# Patient Record
Sex: FEMALE | Race: WHITE | Employment: FULL TIME | URBAN - METROPOLITAN AREA
[De-identification: names, ages, dates, MRNs, and addresses within clinical notes are randomized per-mention and may not be internally consistent; named-entity substitution may affect disease eponyms.]

---

## 2021-02-05 ENCOUNTER — HOSPITAL ENCOUNTER (EMERGENCY)
Age: 26
Discharge: HOME | End: 2021-02-05
Attending: EMERGENCY MEDICINE
Payer: MEDICAID

## 2021-02-05 ENCOUNTER — APPOINTMENT (OUTPATIENT)
Dept: ULTRASOUND IMAGING | Age: 26
End: 2021-02-05
Payer: MEDICAID

## 2021-02-05 ENCOUNTER — APPOINTMENT (OUTPATIENT)
Dept: CT IMAGING | Age: 26
End: 2021-02-05
Payer: MEDICAID

## 2021-02-05 VITALS
HEIGHT: 66 IN | OXYGEN SATURATION: 98 % | WEIGHT: 125 LBS | DIASTOLIC BLOOD PRESSURE: 68 MMHG | TEMPERATURE: 98.6 F | BODY MASS INDEX: 20.09 KG/M2 | RESPIRATION RATE: 20 BRPM | HEART RATE: 68 BPM | SYSTOLIC BLOOD PRESSURE: 106 MMHG

## 2021-02-05 DIAGNOSIS — D72.819 LEUKOPENIA, UNSPECIFIED TYPE: ICD-10-CM

## 2021-02-05 DIAGNOSIS — K80.20 CALCULUS OF GALLBLADDER WITHOUT CHOLECYSTITIS WITHOUT OBSTRUCTION: Primary | ICD-10-CM

## 2021-02-05 DIAGNOSIS — N30.00 ACUTE CYSTITIS WITHOUT HEMATURIA: ICD-10-CM

## 2021-02-05 DIAGNOSIS — N83.292 COMPLEX CYST OF LEFT OVARY: ICD-10-CM

## 2021-02-05 LAB
ALBUMIN SERPL-MCNC: 4.9 G/DL (ref 3.5–5.2)
ALP BLD-CCNC: 65 U/L (ref 35–104)
ALT SERPL-CCNC: 7 U/L (ref 0–32)
ANION GAP SERPL CALCULATED.3IONS-SCNC: 9 MMOL/L (ref 7–16)
AST SERPL-CCNC: 14 U/L (ref 0–31)
BACTERIA: ABNORMAL /HPF
BASOPHILS ABSOLUTE: 0.02 E9/L (ref 0–0.2)
BASOPHILS RELATIVE PERCENT: 0.5 % (ref 0–2)
BILIRUB SERPL-MCNC: 0.5 MG/DL (ref 0–1.2)
BILIRUBIN URINE: NEGATIVE
BLOOD, URINE: NEGATIVE
BUN BLDV-MCNC: 13 MG/DL (ref 6–20)
CALCIUM SERPL-MCNC: 9.3 MG/DL (ref 8.6–10.2)
CHLORIDE BLD-SCNC: 102 MMOL/L (ref 98–107)
CLARITY: ABNORMAL
CO2: 24 MMOL/L (ref 22–29)
COLOR: YELLOW
CREAT SERPL-MCNC: 0.7 MG/DL (ref 0.5–1)
EOSINOPHILS ABSOLUTE: 0.02 E9/L (ref 0.05–0.5)
EOSINOPHILS RELATIVE PERCENT: 0.5 % (ref 0–6)
EPITHELIAL CELLS, UA: ABNORMAL /HPF
GFR AFRICAN AMERICAN: >60
GFR NON-AFRICAN AMERICAN: >60 ML/MIN/1.73
GLUCOSE BLD-MCNC: 105 MG/DL (ref 74–99)
GLUCOSE URINE: NEGATIVE MG/DL
HCG, URINE, POC: NEGATIVE
HCT VFR BLD CALC: 39.6 % (ref 34–48)
HEMOGLOBIN: 13.2 G/DL (ref 11.5–15.5)
IMMATURE GRANULOCYTES #: 0.02 E9/L
IMMATURE GRANULOCYTES %: 0.5 % (ref 0–5)
KETONES, URINE: ABNORMAL MG/DL
LACTIC ACID: 0.8 MMOL/L (ref 0.5–2.2)
LEUKOCYTE ESTERASE, URINE: ABNORMAL
LIPASE: 23 U/L (ref 13–60)
LYMPHOCYTES ABSOLUTE: 0.73 E9/L (ref 1.5–4)
LYMPHOCYTES RELATIVE PERCENT: 16.8 % (ref 20–42)
Lab: NORMAL
MCH RBC QN AUTO: 30.3 PG (ref 26–35)
MCHC RBC AUTO-ENTMCNC: 33.3 % (ref 32–34.5)
MCV RBC AUTO: 90.8 FL (ref 80–99.9)
MONOCYTES ABSOLUTE: 0.31 E9/L (ref 0.1–0.95)
MONOCYTES RELATIVE PERCENT: 7.1 % (ref 2–12)
NEGATIVE QC PASS/FAIL: NORMAL
NEUTROPHILS ABSOLUTE: 3.24 E9/L (ref 1.8–7.3)
NEUTROPHILS RELATIVE PERCENT: 74.6 % (ref 43–80)
NITRITE, URINE: NEGATIVE
PDW BLD-RTO: 13.2 FL (ref 11.5–15)
PH UA: 5.5 (ref 5–9)
PLATELET # BLD: 169 E9/L (ref 130–450)
PMV BLD AUTO: 10.1 FL (ref 7–12)
POSITIVE QC PASS/FAIL: NORMAL
POTASSIUM SERPL-SCNC: 4 MMOL/L (ref 3.5–5)
PROTEIN UA: NEGATIVE MG/DL
RBC # BLD: 4.36 E12/L (ref 3.5–5.5)
RBC UA: ABNORMAL /HPF (ref 0–2)
SODIUM BLD-SCNC: 135 MMOL/L (ref 132–146)
SPECIFIC GRAVITY UA: >=1.03 (ref 1–1.03)
TOTAL PROTEIN: 8.2 G/DL (ref 6.4–8.3)
UROBILINOGEN, URINE: 0.2 E.U./DL
WBC # BLD: 4.3 E9/L (ref 4.5–11.5)
WBC UA: ABNORMAL /HPF (ref 0–5)

## 2021-02-05 PROCEDURE — 85025 COMPLETE CBC W/AUTO DIFF WBC: CPT

## 2021-02-05 PROCEDURE — 80053 COMPREHEN METABOLIC PANEL: CPT

## 2021-02-05 PROCEDURE — 96374 THER/PROPH/DIAG INJ IV PUSH: CPT

## 2021-02-05 PROCEDURE — 99285 EMERGENCY DEPT VISIT HI MDM: CPT

## 2021-02-05 PROCEDURE — 83690 ASSAY OF LIPASE: CPT

## 2021-02-05 PROCEDURE — 76705 ECHO EXAM OF ABDOMEN: CPT

## 2021-02-05 PROCEDURE — 74177 CT ABD & PELVIS W/CONTRAST: CPT

## 2021-02-05 PROCEDURE — 2580000003 HC RX 258: Performed by: PHYSICIAN ASSISTANT

## 2021-02-05 PROCEDURE — 83605 ASSAY OF LACTIC ACID: CPT

## 2021-02-05 PROCEDURE — 81001 URINALYSIS AUTO W/SCOPE: CPT

## 2021-02-05 PROCEDURE — 6360000002 HC RX W HCPCS: Performed by: PHYSICIAN ASSISTANT

## 2021-02-05 PROCEDURE — 2580000003 HC RX 258: Performed by: RADIOLOGY

## 2021-02-05 PROCEDURE — 87088 URINE BACTERIA CULTURE: CPT

## 2021-02-05 PROCEDURE — 6360000004 HC RX CONTRAST MEDICATION: Performed by: RADIOLOGY

## 2021-02-05 PROCEDURE — 76856 US EXAM PELVIC COMPLETE: CPT

## 2021-02-05 PROCEDURE — 96375 TX/PRO/DX INJ NEW DRUG ADDON: CPT

## 2021-02-05 RX ORDER — SODIUM CHLORIDE 0.9 % (FLUSH) 0.9 %
10 SYRINGE (ML) INJECTION PRN
Status: COMPLETED | OUTPATIENT
Start: 2021-02-05 | End: 2021-02-05

## 2021-02-05 RX ORDER — KETOROLAC TROMETHAMINE 30 MG/ML
15 INJECTION, SOLUTION INTRAMUSCULAR; INTRAVENOUS ONCE
Status: COMPLETED | OUTPATIENT
Start: 2021-02-05 | End: 2021-02-05

## 2021-02-05 RX ORDER — SULFAMETHOXAZOLE AND TRIMETHOPRIM 800; 160 MG/1; MG/1
1 TABLET ORAL 2 TIMES DAILY
Qty: 10 TABLET | Refills: 0 | Status: SHIPPED | OUTPATIENT
Start: 2021-02-05 | End: 2021-02-11 | Stop reason: ALTCHOICE

## 2021-02-05 RX ORDER — ONDANSETRON 2 MG/ML
4 INJECTION INTRAMUSCULAR; INTRAVENOUS ONCE
Status: COMPLETED | OUTPATIENT
Start: 2021-02-05 | End: 2021-02-05

## 2021-02-05 RX ORDER — ONDANSETRON 4 MG/1
4 TABLET, ORALLY DISINTEGRATING ORAL EVERY 8 HOURS PRN
Qty: 10 TABLET | Refills: 0 | Status: SHIPPED | OUTPATIENT
Start: 2021-02-05 | End: 2022-03-07

## 2021-02-05 RX ORDER — 0.9 % SODIUM CHLORIDE 0.9 %
1000 INTRAVENOUS SOLUTION INTRAVENOUS ONCE
Status: COMPLETED | OUTPATIENT
Start: 2021-02-05 | End: 2021-02-05

## 2021-02-05 RX ORDER — KETOROLAC TROMETHAMINE 10 MG/1
10 TABLET, FILM COATED ORAL EVERY 8 HOURS PRN
Qty: 20 TABLET | Refills: 0 | Status: SHIPPED | OUTPATIENT
Start: 2021-02-05 | End: 2021-02-11 | Stop reason: ALTCHOICE

## 2021-02-05 RX ADMIN — IOPAMIDOL 75 ML: 755 INJECTION, SOLUTION INTRAVENOUS at 11:01

## 2021-02-05 RX ADMIN — KETOROLAC TROMETHAMINE 15 MG: 30 INJECTION, SOLUTION INTRAMUSCULAR at 09:56

## 2021-02-05 RX ADMIN — Medication 10 ML: at 10:59

## 2021-02-05 RX ADMIN — ONDANSETRON 4 MG: 2 INJECTION INTRAMUSCULAR; INTRAVENOUS at 09:57

## 2021-02-05 RX ADMIN — SODIUM CHLORIDE 1000 ML: 9 INJECTION, SOLUTION INTRAVENOUS at 09:56

## 2021-02-05 NOTE — ED PROVIDER NOTES
Lymphocytes % 16.8 (L) 20.0 - 42.0 %    Monocytes % 7.1 2.0 - 12.0 %    Eosinophils % 0.5 0.0 - 6.0 %    Basophils % 0.5 0.0 - 2.0 %    Neutrophils Absolute 3.24 1.80 - 7.30 E9/L    Immature Granulocytes # 0.02 E9/L    Lymphocytes Absolute 0.73 (L) 1.50 - 4.00 E9/L    Monocytes Absolute 0.31 0.10 - 0.95 E9/L    Eosinophils Absolute 0.02 (L) 0.05 - 0.50 E9/L    Basophils Absolute 0.02 0.00 - 0.20 E9/L   Comprehensive Metabolic Panel   Result Value Ref Range    Sodium 135 132 - 146 mmol/L    Potassium 4.0 3.5 - 5.0 mmol/L    Chloride 102 98 - 107 mmol/L    CO2 24 22 - 29 mmol/L    Anion Gap 9 7 - 16 mmol/L    Glucose 105 (H) 74 - 99 mg/dL    BUN 13 6 - 20 mg/dL    CREATININE 0.7 0.5 - 1.0 mg/dL    GFR Non-African American >60 >=60 mL/min/1.73    GFR African American >60     Calcium 9.3 8.6 - 10.2 mg/dL    Total Protein 8.2 6.4 - 8.3 g/dL    Albumin 4.9 3.5 - 5.2 g/dL    Total Bilirubin 0.5 0.0 - 1.2 mg/dL    Alkaline Phosphatase 65 35 - 104 U/L    ALT 7 0 - 32 U/L    AST 14 0 - 31 U/L   Lipase   Result Value Ref Range    Lipase 23 13 - 60 U/L   Lactic Acid, Plasma   Result Value Ref Range    Lactic Acid 0.8 0.5 - 2.2 mmol/L   POC Pregnancy Urine Qual   Result Value Ref Range    HCG, Urine, POC Negative Negative    Lot Number [id number]     Positive QC Pass/Fail Pass     Negative QC Pass/Fail Pass      Imaging: All Radiology results interpreted by Radiologist unless otherwise noted. US PELVIS COMPLETE   Final Result   1. 4.2 cm complex left ovarian cyst.  Please see below for recommendations. 2. Otherwise, unremarkable transabdominal pelvic ultrasound. RECOMMENDATIONS:   4.2 cm worrisome for malignancy ovarian cyst. Recommend surgical evaluation. Reference: Radiology 2010 Sep;256(3):943-54      US GALLBLADDER RUQ   Final Result   1. Cholelithiasis. There is no evidence of acute cholecystitis.       CT ABDOMEN PELVIS W IV CONTRAST   Final Result   Distended gallbladder with questionable mild circumferential wall thickening. Cholelithiasis is noted as well. Recommend correlation with right upper   quadrant ultrasound if the patient has right upper quadrant pain, as acute   cholecystitis cannot be excluded. 4.6 cm cyst in the midline of the pelvis, felt to likely be arising from the   left ovary. Given the adjacent free fluid and size of the cyst, recommend   correlation with pelvic ultrasound. Suspect small area of focal fat deposition in the right hepatic lobe. Recommend correlation with right upper quadrant ultrasound on a nonemergent   basis. ED Course / Medical Decision Making     Medications   ondansetron [facility]) injection 4 mg (4 mg Intravenous Given 2/5/21 0957)   0.9 % sodium chloride bolus (0 mLs Intravenous Stopped 2/5/21 1152)   ketorolac (TORADOL) injection 15 mg (15 mg Intravenous Given 2/5/21 0956)   iopamidol (ISOVUE-370) 76 % injection 75 mL (75 mLs Intravenous Given 2/5/21 1101)   sodium chloride flush 0.9 % injection 10 mL (10 mLs Intravenous Given 2/5/21 1059)          Consults:   None    Procedures:   none    MDM:   Patient presenting with right-sided abdominal pain. Patient is in no acute distress, afebrile, nontoxic appearance. Patient's labs are stable except for leukopenia. Patient CT concerning for distended gallbladder with recommendation for ultrasound. Patient CT also showed a left ovarian cyst and recommended ultrasound. Patient's gallbladder ultrasound showed a cholelithiasis but no cholecystitis. Patient's ultrasound of her ovaries showed a 4.2 cm complex left ovarian cyst with recommendation for surgical evaluation. Patient's urine showed possible UTI-patient will be treated with Bactrim due to her penicillin allergy. Patient will be sent with Toradol and Zofran as well since it controlled her pain in the ED. Patient to follow-up with gynecology and general surgery. Recommended patient return to the ED with new or worsening of symptoms.     Plan of Care/Counseling: I reviewed today's visit with the patient in addition to providing specific details for the plan of care and counseling regarding the diagnosis and prognosis. Questions are answered at this time and are agreeable with the plan. Assessment      1. Calculus of gallbladder without cholecystitis without obstruction    2. Complex cyst of left ovary    3. Leukopenia, unspecified type    4. Acute cystitis without hematuria      Plan   Discharge home  Patient condition is stable    New Medications     Discharge Medication List as of 2/5/2021  1:39 PM      START taking these medications    Details   ketorolac (TORADOL) 10 MG tablet Take 1 tablet by mouth every 8 hours as needed for Pain, Disp-20 tablet, R-0Print      ondansetron (ZOFRAN ODT) 4 MG disintegrating tablet Take 1 tablet by mouth every 8 hours as needed for Nausea or Vomiting, Disp-10 tablet, R-0Print      sulfamethoxazole-trimethoprim (BACTRIM DS) 800-160 MG per tablet Take 1 tablet by mouth 2 times daily for 5 days, Disp-10 tablet, R-0Print           Electronically signed by Wilson Bhatt PA-C   DD: 2/5/21  **This report was transcribed using voice recognition software. Every effort was made to ensure accuracy; however, inadvertent computerized transcription errors may be present.   END OF ED PROVIDER NOTE     Wilson Bhatt PA-C  02/06/21 9949

## 2021-02-07 LAB — URINE CULTURE, ROUTINE: NORMAL

## 2021-02-11 ENCOUNTER — HOSPITAL ENCOUNTER (EMERGENCY)
Age: 26
Discharge: HOME | DRG: 282 | End: 2021-02-11
Attending: EMERGENCY MEDICINE
Payer: MEDICAID

## 2021-02-11 ENCOUNTER — APPOINTMENT (OUTPATIENT)
Dept: GENERAL RADIOLOGY | Age: 26
DRG: 282 | End: 2021-02-11
Payer: MEDICAID

## 2021-02-11 VITALS
WEIGHT: 125 LBS | DIASTOLIC BLOOD PRESSURE: 74 MMHG | HEART RATE: 82 BPM | OXYGEN SATURATION: 99 % | HEIGHT: 66 IN | BODY MASS INDEX: 20.09 KG/M2 | TEMPERATURE: 98.5 F | SYSTOLIC BLOOD PRESSURE: 110 MMHG | RESPIRATION RATE: 16 BRPM

## 2021-02-11 DIAGNOSIS — K80.50 BILIARY COLIC: Primary | ICD-10-CM

## 2021-02-11 LAB
ALBUMIN SERPL-MCNC: 4.5 G/DL (ref 3.5–5.2)
ALP BLD-CCNC: 75 U/L (ref 35–104)
ALT SERPL-CCNC: 44 U/L (ref 0–32)
ANION GAP SERPL CALCULATED.3IONS-SCNC: 9 MMOL/L (ref 7–16)
AST SERPL-CCNC: 110 U/L (ref 0–31)
BACTERIA: ABNORMAL /HPF
BASOPHILS ABSOLUTE: 0.02 E9/L (ref 0–0.2)
BASOPHILS RELATIVE PERCENT: 0.2 % (ref 0–2)
BILIRUB SERPL-MCNC: 0.9 MG/DL (ref 0–1.2)
BILIRUBIN URINE: NEGATIVE
BLOOD, URINE: NEGATIVE
BUN BLDV-MCNC: 11 MG/DL (ref 6–20)
CALCIUM SERPL-MCNC: 9 MG/DL (ref 8.6–10.2)
CHLORIDE BLD-SCNC: 100 MMOL/L (ref 98–107)
CLARITY: CLEAR
CO2: 24 MMOL/L (ref 22–29)
COLOR: YELLOW
CREAT SERPL-MCNC: 0.9 MG/DL (ref 0.5–1)
EOSINOPHILS ABSOLUTE: 0.01 E9/L (ref 0.05–0.5)
EOSINOPHILS RELATIVE PERCENT: 0.1 % (ref 0–6)
GFR AFRICAN AMERICAN: >60
GFR NON-AFRICAN AMERICAN: >60 ML/MIN/1.73
GLUCOSE BLD-MCNC: 125 MG/DL (ref 74–99)
GLUCOSE URINE: NEGATIVE MG/DL
HCG, URINE, POC: NEGATIVE
HCT VFR BLD CALC: 36.2 % (ref 34–48)
HEMOGLOBIN: 12.3 G/DL (ref 11.5–15.5)
IMMATURE GRANULOCYTES #: 0.02 E9/L
IMMATURE GRANULOCYTES %: 0.2 % (ref 0–5)
KETONES, URINE: 15 MG/DL
LACTIC ACID: 0.8 MMOL/L (ref 0.5–2.2)
LEUKOCYTE ESTERASE, URINE: ABNORMAL
LIPASE: 29 U/L (ref 13–60)
LYMPHOCYTES ABSOLUTE: 0.65 E9/L (ref 1.5–4)
LYMPHOCYTES RELATIVE PERCENT: 7.8 % (ref 20–42)
Lab: NORMAL
MCH RBC QN AUTO: 30.4 PG (ref 26–35)
MCHC RBC AUTO-ENTMCNC: 34 % (ref 32–34.5)
MCV RBC AUTO: 89.4 FL (ref 80–99.9)
MONOCYTES ABSOLUTE: 0.59 E9/L (ref 0.1–0.95)
MONOCYTES RELATIVE PERCENT: 7.1 % (ref 2–12)
NEGATIVE QC PASS/FAIL: NORMAL
NEUTROPHILS ABSOLUTE: 7.05 E9/L (ref 1.8–7.3)
NEUTROPHILS RELATIVE PERCENT: 84.6 % (ref 43–80)
NITRITE, URINE: NEGATIVE
PDW BLD-RTO: 13 FL (ref 11.5–15)
PH UA: 6.5 (ref 5–9)
PLATELET # BLD: 176 E9/L (ref 130–450)
PMV BLD AUTO: 10 FL (ref 7–12)
POSITIVE QC PASS/FAIL: NORMAL
POTASSIUM SERPL-SCNC: 3.9 MMOL/L (ref 3.5–5)
PROTEIN UA: NEGATIVE MG/DL
RBC # BLD: 4.05 E12/L (ref 3.5–5.5)
RBC UA: ABNORMAL /HPF (ref 0–2)
RENAL EPITHELIAL, UA: ABNORMAL /HPF
SODIUM BLD-SCNC: 133 MMOL/L (ref 132–146)
SPECIFIC GRAVITY UA: 1.01 (ref 1–1.03)
TOTAL PROTEIN: 7.4 G/DL (ref 6.4–8.3)
UROBILINOGEN, URINE: 1 E.U./DL
WBC # BLD: 8.3 E9/L (ref 4.5–11.5)
WBC UA: ABNORMAL /HPF (ref 0–5)

## 2021-02-11 PROCEDURE — 83605 ASSAY OF LACTIC ACID: CPT

## 2021-02-11 PROCEDURE — 83690 ASSAY OF LIPASE: CPT

## 2021-02-11 PROCEDURE — 74022 RADEX COMPL AQT ABD SERIES: CPT

## 2021-02-11 PROCEDURE — 85025 COMPLETE CBC W/AUTO DIFF WBC: CPT

## 2021-02-11 PROCEDURE — 2580000003 HC RX 258: Performed by: EMERGENCY MEDICINE

## 2021-02-11 PROCEDURE — 6360000002 HC RX W HCPCS: Performed by: EMERGENCY MEDICINE

## 2021-02-11 PROCEDURE — 81001 URINALYSIS AUTO W/SCOPE: CPT

## 2021-02-11 PROCEDURE — 80053 COMPREHEN METABOLIC PANEL: CPT

## 2021-02-11 RX ORDER — 0.9 % SODIUM CHLORIDE 0.9 %
500 INTRAVENOUS SOLUTION INTRAVENOUS ONCE
Status: COMPLETED | OUTPATIENT
Start: 2021-02-11 | End: 2021-02-11

## 2021-02-11 RX ORDER — FAMOTIDINE 20 MG/1
20 TABLET, FILM COATED ORAL 2 TIMES DAILY PRN
Qty: 60 TABLET | Refills: 0 | Status: SHIPPED | OUTPATIENT
Start: 2021-02-11 | End: 2022-03-07

## 2021-02-11 RX ORDER — MORPHINE SULFATE 4 MG/ML
4 INJECTION, SOLUTION INTRAMUSCULAR; INTRAVENOUS ONCE
Status: COMPLETED | OUTPATIENT
Start: 2021-02-11 | End: 2021-02-11

## 2021-02-11 RX ORDER — KETOROLAC TROMETHAMINE 30 MG/ML
15 INJECTION, SOLUTION INTRAMUSCULAR; INTRAVENOUS ONCE
Status: COMPLETED | OUTPATIENT
Start: 2021-02-11 | End: 2021-02-11

## 2021-02-11 RX ORDER — ONDANSETRON 4 MG/1
4 TABLET, ORALLY DISINTEGRATING ORAL EVERY 8 HOURS PRN
Qty: 10 TABLET | Refills: 0 | Status: ON HOLD | OUTPATIENT
Start: 2021-02-11 | End: 2021-02-18 | Stop reason: HOSPADM

## 2021-02-11 RX ORDER — HYDROCODONE BITARTRATE AND ACETAMINOPHEN 5; 325 MG/1; MG/1
1 TABLET ORAL EVERY 6 HOURS PRN
Qty: 10 TABLET | Refills: 0 | Status: ON HOLD | OUTPATIENT
Start: 2021-02-11 | End: 2021-02-18

## 2021-02-11 RX ADMIN — SODIUM CHLORIDE 500 ML: 9 INJECTION, SOLUTION INTRAVENOUS at 02:41

## 2021-02-11 RX ADMIN — MORPHINE SULFATE 4 MG: 4 INJECTION, SOLUTION INTRAMUSCULAR; INTRAVENOUS at 03:55

## 2021-02-11 RX ADMIN — SODIUM CHLORIDE 500 ML: 9 INJECTION, SOLUTION INTRAVENOUS at 03:55

## 2021-02-11 RX ADMIN — KETOROLAC TROMETHAMINE 15 MG: 30 INJECTION, SOLUTION INTRAMUSCULAR; INTRAVENOUS at 03:09

## 2021-02-11 ASSESSMENT — PAIN DESCRIPTION - DESCRIPTORS: DESCRIPTORS: ACHING

## 2021-02-11 ASSESSMENT — PAIN DESCRIPTION - FREQUENCY: FREQUENCY: CONTINUOUS

## 2021-02-11 ASSESSMENT — PAIN SCALES - GENERAL
PAINLEVEL_OUTOF10: 5
PAINLEVEL_OUTOF10: 3

## 2021-02-11 ASSESSMENT — PAIN DESCRIPTION - LOCATION: LOCATION: ABDOMEN;FLANK

## 2021-02-11 NOTE — ED PROVIDER NOTES
Clear to auscultation and breath sounds equal.  CV:  Regular rate and rhythm. GI:  normal appearing, non-distended with no visible hernias. Bowel sounds: normal bowel sounds. Tenderness: There is mild tenderness present - located in the RUQ., There is no rebound tenderness. , There is no guarding. , There is no distension. , There is no pulsatile mass. .           Liver: non-tender. Spleen:  non-tender. Back: CVA Tenderness: No.  : deferred  Integument:  Normal turgor. Warm, dry, without visible rash, unless noted elsewhere. Lymphatics: No edema, cap.refill <3sec. Neurological:  Orientation age-appropriate. Motor functions intact.     Lab / Imaging Results   (All laboratory and radiology results have been personally reviewed by myself)  Labs:  Results for orders placed or performed during the hospital encounter of 02/11/21   Comprehensive Metabolic Panel   Result Value Ref Range    Sodium 133 132 - 146 mmol/L    Potassium 3.9 3.5 - 5.0 mmol/L    Chloride 100 98 - 107 mmol/L    CO2 24 22 - 29 mmol/L    Anion Gap 9 7 - 16 mmol/L    Glucose 125 (H) 74 - 99 mg/dL    BUN 11 6 - 20 mg/dL    CREATININE 0.9 0.5 - 1.0 mg/dL    GFR Non-African American >60 >=60 mL/min/1.73    GFR African American >60     Calcium 9.0 8.6 - 10.2 mg/dL    Total Protein 7.4 6.4 - 8.3 g/dL    Albumin 4.5 3.5 - 5.2 g/dL    Total Bilirubin 0.9 0.0 - 1.2 mg/dL    Alkaline Phosphatase 75 35 - 104 U/L    ALT 44 (H) 0 - 32 U/L     (H) 0 - 31 U/L   CBC Auto Differential   Result Value Ref Range    WBC 8.3 4.5 - 11.5 E9/L    RBC 4.05 3.50 - 5.50 E12/L    Hemoglobin 12.3 11.5 - 15.5 g/dL    Hematocrit 36.2 34.0 - 48.0 %    MCV 89.4 80.0 - 99.9 fL    MCH 30.4 26.0 - 35.0 pg    MCHC 34.0 32.0 - 34.5 %    RDW 13.0 11.5 - 15.0 fL    Platelets 357 119 - 987 E9/L    MPV 10.0 7.0 - 12.0 fL    Neutrophils % 84.6 (H) 43.0 - 80.0 %    Immature Granulocytes % 0.2 0.0 - 5.0 % Lymphocytes % 7.8 (L) 20.0 - 42.0 %    Monocytes % 7.1 2.0 - 12.0 %    Eosinophils % 0.1 0.0 - 6.0 %    Basophils % 0.2 0.0 - 2.0 %    Neutrophils Absolute 7.05 1.80 - 7.30 E9/L    Immature Granulocytes # 0.02 E9/L    Lymphocytes Absolute 0.65 (L) 1.50 - 4.00 E9/L    Monocytes Absolute 0.59 0.10 - 0.95 E9/L    Eosinophils Absolute 0.01 (L) 0.05 - 0.50 E9/L    Basophils Absolute 0.02 0.00 - 0.20 E9/L   Lipase   Result Value Ref Range    Lipase 29 13 - 60 U/L   Urinalysis   Result Value Ref Range    Color, UA Yellow Straw/Yellow    Clarity, UA Clear Clear    Glucose, Ur Negative Negative mg/dL    Bilirubin Urine Negative Negative    Ketones, Urine 15 (A) Negative mg/dL    Specific Gravity, UA 1.015 1.005 - 1.030    Blood, Urine Negative Negative    pH, UA 6.5 5.0 - 9.0    Protein, UA Negative Negative mg/dL    Urobilinogen, Urine 1.0 <2.0 E.U./dL    Nitrite, Urine Negative Negative    Leukocyte Esterase, Urine SMALL (A) Negative   Lactic Acid, Plasma   Result Value Ref Range    Lactic Acid 0.8 0.5 - 2.2 mmol/L   Microscopic Urinalysis   Result Value Ref Range    WBC, UA 2-5 0 - 5 /HPF    RBC, UA 0-1 0 - 2 /HPF    Renal Epithelial, UA FEW /HPF    Bacteria, UA RARE (A) None Seen /HPF   POC Pregnancy Urine Qual   Result Value Ref Range    HCG, Urine, POC Negative Negative    Lot Number [id number]     Positive QC Pass/Fail Pass     Negative QC Pass/Fail Pass      Imaging: All Radiology results interpreted by Radiologist unless otherwise noted. XR ACUTE ABD SERIES CHEST 1 VW   Final Result   Cholelithiasis. Moderate stool burden.         ED Course / Medical Decision Making     Medications   0.9 % sodium chloride bolus (0 mLs Intravenous Stopped 2/11/21 5608)   ketorolac (TORADOL) injection 15 mg (15 mg Intravenous Given 2/11/21 7396)   morphine sulfate (PF) injection 4 mg (4 mg Intravenous Given 2/11/21 7537)   0.9 % sodium chloride bolus (0 mLs Intravenous Stopped 2/11/21 0517)        Re-examination:  2/11/21 inadvertent computerized transcription errors may be present.   END OF ED PROVIDER NOTE        Xenia Banegas, DO  02/11/21 5837

## 2021-02-11 NOTE — ED TRIAGE NOTES
Pt states seen previously for same complaint.  Was diagnosed with gallstones and ovarian cysts and has not followed up with provider for issue

## 2021-02-13 ENCOUNTER — HOSPITAL ENCOUNTER (INPATIENT)
Age: 26
LOS: 5 days | Discharge: HOME | DRG: 282 | End: 2021-02-18
Attending: EMERGENCY MEDICINE | Admitting: INTERNAL MEDICINE
Payer: MEDICAID

## 2021-02-13 DIAGNOSIS — K85.10 ACUTE GALLSTONE PANCREATITIS: Primary | ICD-10-CM

## 2021-02-13 DIAGNOSIS — K80.50 BILIARY COLIC: ICD-10-CM

## 2021-02-13 PROBLEM — K85.90 PANCREATITIS, UNSPECIFIED PANCREATITIS TYPE: Status: ACTIVE | Noted: 2021-02-13

## 2021-02-13 LAB
ALBUMIN SERPL-MCNC: 4.3 G/DL (ref 3.5–5.2)
ALP BLD-CCNC: 113 U/L (ref 35–104)
ALT SERPL-CCNC: 496 U/L (ref 0–32)
ANION GAP SERPL CALCULATED.3IONS-SCNC: 10 MMOL/L (ref 7–16)
AST SERPL-CCNC: 410 U/L (ref 0–31)
BACTERIA: NORMAL /HPF
BASOPHILS ABSOLUTE: 0.02 E9/L (ref 0–0.2)
BASOPHILS RELATIVE PERCENT: 0.4 % (ref 0–2)
BILIRUB SERPL-MCNC: 3.5 MG/DL (ref 0–1.2)
BILIRUBIN URINE: ABNORMAL
BLOOD, URINE: NEGATIVE
BUN BLDV-MCNC: 8 MG/DL (ref 6–20)
CALCIUM SERPL-MCNC: 8.9 MG/DL (ref 8.6–10.2)
CHLORIDE BLD-SCNC: 101 MMOL/L (ref 98–107)
CLARITY: CLEAR
CO2: 25 MMOL/L (ref 22–29)
COLOR: YELLOW
CREAT SERPL-MCNC: 1 MG/DL (ref 0.5–1)
EOSINOPHILS ABSOLUTE: 0.09 E9/L (ref 0.05–0.5)
EOSINOPHILS RELATIVE PERCENT: 1.9 % (ref 0–6)
EPITHELIAL CELLS, UA: NORMAL /HPF
GFR AFRICAN AMERICAN: >60
GFR NON-AFRICAN AMERICAN: >60 ML/MIN/1.73
GLUCOSE BLD-MCNC: 134 MG/DL (ref 74–99)
GLUCOSE URINE: NEGATIVE MG/DL
HCG(URINE) PREGNANCY TEST: NEGATIVE
HCT VFR BLD CALC: 36.8 % (ref 34–48)
HEMOGLOBIN: 12.2 G/DL (ref 11.5–15.5)
IMMATURE GRANULOCYTES #: 0.01 E9/L
IMMATURE GRANULOCYTES %: 0.2 % (ref 0–5)
KETONES, URINE: NEGATIVE MG/DL
LEUKOCYTE ESTERASE, URINE: ABNORMAL
LIPASE: >3000 U/L (ref 13–60)
LYMPHOCYTES ABSOLUTE: 0.87 E9/L (ref 1.5–4)
LYMPHOCYTES RELATIVE PERCENT: 18.7 % (ref 20–42)
MCH RBC QN AUTO: 30.7 PG (ref 26–35)
MCHC RBC AUTO-ENTMCNC: 33.2 % (ref 32–34.5)
MCV RBC AUTO: 92.5 FL (ref 80–99.9)
MONOCYTES ABSOLUTE: 0.4 E9/L (ref 0.1–0.95)
MONOCYTES RELATIVE PERCENT: 8.6 % (ref 2–12)
NEUTROPHILS ABSOLUTE: 3.27 E9/L (ref 1.8–7.3)
NEUTROPHILS RELATIVE PERCENT: 70.2 % (ref 43–80)
NITRITE, URINE: NEGATIVE
PDW BLD-RTO: 13.4 FL (ref 11.5–15)
PH UA: 5.5 (ref 5–9)
PLATELET # BLD: 145 E9/L (ref 130–450)
PMV BLD AUTO: 10.2 FL (ref 7–12)
POTASSIUM REFLEX MAGNESIUM: 3.7 MMOL/L (ref 3.5–5)
PROTEIN UA: NEGATIVE MG/DL
RBC # BLD: 3.98 E12/L (ref 3.5–5.5)
RBC UA: NORMAL /HPF (ref 0–2)
SODIUM BLD-SCNC: 136 MMOL/L (ref 132–146)
SPECIFIC GRAVITY UA: 1.01 (ref 1–1.03)
TOTAL PROTEIN: 7.1 G/DL (ref 6.4–8.3)
UROBILINOGEN, URINE: 1 E.U./DL
WBC # BLD: 4.7 E9/L (ref 4.5–11.5)
WBC UA: NORMAL /HPF (ref 0–5)

## 2021-02-13 PROCEDURE — 1200000000 HC SEMI PRIVATE

## 2021-02-13 PROCEDURE — 6370000000 HC RX 637 (ALT 250 FOR IP): Performed by: INTERNAL MEDICINE

## 2021-02-13 PROCEDURE — 99283 EMERGENCY DEPT VISIT LOW MDM: CPT

## 2021-02-13 PROCEDURE — 83690 ASSAY OF LIPASE: CPT

## 2021-02-13 PROCEDURE — 81025 URINE PREGNANCY TEST: CPT

## 2021-02-13 PROCEDURE — 99222 1ST HOSP IP/OBS MODERATE 55: CPT | Performed by: INTERNAL MEDICINE

## 2021-02-13 PROCEDURE — 2580000003 HC RX 258: Performed by: INTERNAL MEDICINE

## 2021-02-13 PROCEDURE — 81001 URINALYSIS AUTO W/SCOPE: CPT

## 2021-02-13 PROCEDURE — 6360000002 HC RX W HCPCS

## 2021-02-13 PROCEDURE — 80053 COMPREHEN METABOLIC PANEL: CPT

## 2021-02-13 PROCEDURE — 6360000002 HC RX W HCPCS: Performed by: STUDENT IN AN ORGANIZED HEALTH CARE EDUCATION/TRAINING PROGRAM

## 2021-02-13 PROCEDURE — 96374 THER/PROPH/DIAG INJ IV PUSH: CPT

## 2021-02-13 PROCEDURE — 85025 COMPLETE CBC W/AUTO DIFF WBC: CPT

## 2021-02-13 PROCEDURE — 2580000003 HC RX 258: Performed by: STUDENT IN AN ORGANIZED HEALTH CARE EDUCATION/TRAINING PROGRAM

## 2021-02-13 PROCEDURE — 6360000002 HC RX W HCPCS: Performed by: INTERNAL MEDICINE

## 2021-02-13 RX ORDER — PROMETHAZINE HYDROCHLORIDE 25 MG/1
12.5 TABLET ORAL EVERY 6 HOURS PRN
Status: DISCONTINUED | OUTPATIENT
Start: 2021-02-13 | End: 2021-02-13

## 2021-02-13 RX ORDER — SODIUM CHLORIDE 0.9 % (FLUSH) 0.9 %
10 SYRINGE (ML) INJECTION PRN
Status: DISCONTINUED | OUTPATIENT
Start: 2021-02-13 | End: 2021-02-18 | Stop reason: HOSPADM

## 2021-02-13 RX ORDER — ACETAMINOPHEN 650 MG/1
650 SUPPOSITORY RECTAL EVERY 6 HOURS PRN
Status: DISCONTINUED | OUTPATIENT
Start: 2021-02-13 | End: 2021-02-18 | Stop reason: HOSPADM

## 2021-02-13 RX ORDER — FENTANYL CITRATE 50 UG/ML
50 INJECTION, SOLUTION INTRAMUSCULAR; INTRAVENOUS ONCE
Status: COMPLETED | OUTPATIENT
Start: 2021-02-13 | End: 2021-02-13

## 2021-02-13 RX ORDER — ACETAMINOPHEN 325 MG/1
650 TABLET ORAL EVERY 6 HOURS PRN
Status: DISCONTINUED | OUTPATIENT
Start: 2021-02-13 | End: 2021-02-18 | Stop reason: HOSPADM

## 2021-02-13 RX ORDER — PROMETHAZINE HYDROCHLORIDE 25 MG/ML
12.5 INJECTION, SOLUTION INTRAMUSCULAR; INTRAVENOUS EVERY 6 HOURS PRN
Status: DISCONTINUED | OUTPATIENT
Start: 2021-02-13 | End: 2021-02-18 | Stop reason: HOSPADM

## 2021-02-13 RX ORDER — POTASSIUM CHLORIDE 7.45 MG/ML
10 INJECTION INTRAVENOUS PRN
Status: DISCONTINUED | OUTPATIENT
Start: 2021-02-13 | End: 2021-02-15

## 2021-02-13 RX ORDER — PROMETHAZINE HYDROCHLORIDE 25 MG/ML
12.5 INJECTION, SOLUTION INTRAMUSCULAR; INTRAVENOUS ONCE
Status: COMPLETED | OUTPATIENT
Start: 2021-02-13 | End: 2021-02-13

## 2021-02-13 RX ORDER — SODIUM CHLORIDE, SODIUM LACTATE, POTASSIUM CHLORIDE, CALCIUM CHLORIDE 600; 310; 30; 20 MG/100ML; MG/100ML; MG/100ML; MG/100ML
INJECTION, SOLUTION INTRAVENOUS CONTINUOUS
Status: DISCONTINUED | OUTPATIENT
Start: 2021-02-13 | End: 2021-02-15

## 2021-02-13 RX ORDER — ONDANSETRON 2 MG/ML
4 INJECTION INTRAMUSCULAR; INTRAVENOUS EVERY 6 HOURS PRN
Status: DISCONTINUED | OUTPATIENT
Start: 2021-02-13 | End: 2021-02-18 | Stop reason: HOSPADM

## 2021-02-13 RX ORDER — SODIUM CHLORIDE 0.9 % (FLUSH) 0.9 %
10 SYRINGE (ML) INJECTION EVERY 12 HOURS SCHEDULED
Status: DISCONTINUED | OUTPATIENT
Start: 2021-02-13 | End: 2021-02-18 | Stop reason: HOSPADM

## 2021-02-13 RX ORDER — KETOROLAC TROMETHAMINE 10 MG/1
10 TABLET, FILM COATED ORAL EVERY 6 HOURS PRN
COMMUNITY
End: 2022-03-07

## 2021-02-13 RX ORDER — POTASSIUM CHLORIDE 20 MEQ/1
40 TABLET, EXTENDED RELEASE ORAL PRN
Status: DISCONTINUED | OUTPATIENT
Start: 2021-02-13 | End: 2021-02-15

## 2021-02-13 RX ORDER — KETOROLAC TROMETHAMINE 30 MG/ML
30 INJECTION, SOLUTION INTRAMUSCULAR; INTRAVENOUS ONCE
Status: COMPLETED | OUTPATIENT
Start: 2021-02-13 | End: 2021-02-13

## 2021-02-13 RX ORDER — MORPHINE SULFATE 2 MG/ML
2 INJECTION, SOLUTION INTRAMUSCULAR; INTRAVENOUS ONCE
Status: COMPLETED | OUTPATIENT
Start: 2021-02-13 | End: 2021-02-13

## 2021-02-13 RX ORDER — 0.9 % SODIUM CHLORIDE 0.9 %
1000 INTRAVENOUS SOLUTION INTRAVENOUS ONCE
Status: COMPLETED | OUTPATIENT
Start: 2021-02-13 | End: 2021-02-13

## 2021-02-13 RX ORDER — ONDANSETRON 2 MG/ML
INJECTION INTRAMUSCULAR; INTRAVENOUS
Status: COMPLETED
Start: 2021-02-13 | End: 2021-02-13

## 2021-02-13 RX ORDER — MORPHINE SULFATE 2 MG/ML
2 INJECTION, SOLUTION INTRAMUSCULAR; INTRAVENOUS
Status: DISCONTINUED | OUTPATIENT
Start: 2021-02-13 | End: 2021-02-13

## 2021-02-13 RX ADMIN — MICONAZOLE NITRATE 200 MG: 200 SUPPOSITORY VAGINAL at 11:12

## 2021-02-13 RX ADMIN — HYDROMORPHONE HYDROCHLORIDE 1 MG: 1 INJECTION, SOLUTION INTRAMUSCULAR; INTRAVENOUS; SUBCUTANEOUS at 18:35

## 2021-02-13 RX ADMIN — KETOROLAC TROMETHAMINE 30 MG: 30 INJECTION, SOLUTION INTRAMUSCULAR; INTRAVENOUS at 05:06

## 2021-02-13 RX ADMIN — ONDANSETRON 4 MG: 2 INJECTION INTRAMUSCULAR; INTRAVENOUS at 02:39

## 2021-02-13 RX ADMIN — PROMETHAZINE HYDROCHLORIDE 12.5 MG: 25 INJECTION INTRAMUSCULAR; INTRAVENOUS at 02:06

## 2021-02-13 RX ADMIN — HYDROMORPHONE HYDROCHLORIDE 1 MG: 1 INJECTION, SOLUTION INTRAMUSCULAR; INTRAVENOUS; SUBCUTANEOUS at 15:02

## 2021-02-13 RX ADMIN — MORPHINE SULFATE 2 MG: 2 INJECTION, SOLUTION INTRAMUSCULAR; INTRAVENOUS at 02:47

## 2021-02-13 RX ADMIN — HYDROMORPHONE HYDROCHLORIDE 1 MG: 1 INJECTION, SOLUTION INTRAMUSCULAR; INTRAVENOUS; SUBCUTANEOUS at 04:35

## 2021-02-13 RX ADMIN — SODIUM CHLORIDE, POTASSIUM CHLORIDE, SODIUM LACTATE AND CALCIUM CHLORIDE: 600; 310; 30; 20 INJECTION, SOLUTION INTRAVENOUS at 15:53

## 2021-02-13 RX ADMIN — ONDANSETRON 4 MG: 2 INJECTION INTRAMUSCULAR; INTRAVENOUS at 21:33

## 2021-02-13 RX ADMIN — MORPHINE SULFATE 2 MG: 2 INJECTION, SOLUTION INTRAMUSCULAR; INTRAVENOUS at 02:08

## 2021-02-13 RX ADMIN — SODIUM CHLORIDE 1000 ML: 9 INJECTION, SOLUTION INTRAVENOUS at 01:10

## 2021-02-13 RX ADMIN — PROMETHAZINE HYDROCHLORIDE 12.5 MG: 25 INJECTION INTRAMUSCULAR; INTRAVENOUS at 18:24

## 2021-02-13 RX ADMIN — HYDROMORPHONE HYDROCHLORIDE 1 MG: 1 INJECTION, SOLUTION INTRAMUSCULAR; INTRAVENOUS; SUBCUTANEOUS at 11:12

## 2021-02-13 RX ADMIN — SODIUM CHLORIDE, POTASSIUM CHLORIDE, SODIUM LACTATE AND CALCIUM CHLORIDE: 600; 310; 30; 20 INJECTION, SOLUTION INTRAVENOUS at 02:14

## 2021-02-13 RX ADMIN — HYDROMORPHONE HYDROCHLORIDE 1 MG: 1 INJECTION, SOLUTION INTRAMUSCULAR; INTRAVENOUS; SUBCUTANEOUS at 21:33

## 2021-02-13 RX ADMIN — FENTANYL CITRATE 50 MCG: 50 INJECTION, SOLUTION INTRAMUSCULAR; INTRAVENOUS at 01:15

## 2021-02-13 RX ADMIN — PROMETHAZINE HYDROCHLORIDE 12.5 MG: 25 INJECTION INTRAMUSCULAR; INTRAVENOUS at 04:34

## 2021-02-13 RX ADMIN — PROMETHAZINE HYDROCHLORIDE 12.5 MG: 25 INJECTION INTRAMUSCULAR; INTRAVENOUS at 11:11

## 2021-02-13 RX ADMIN — Medication 10 ML: at 21:33

## 2021-02-13 RX ADMIN — HYDROMORPHONE HYDROCHLORIDE 1 MG: 1 INJECTION, SOLUTION INTRAMUSCULAR; INTRAVENOUS; SUBCUTANEOUS at 07:33

## 2021-02-13 ASSESSMENT — ENCOUNTER SYMPTOMS
WHEEZING: 0
ABDOMINAL DISTENTION: 0
VOMITING: 1
NAUSEA: 1
SHORTNESS OF BREATH: 0
SINUS PRESSURE: 0
DIARRHEA: 0
BACK PAIN: 0
SORE THROAT: 0
ABDOMINAL PAIN: 1
EYE PAIN: 0
CONSTIPATION: 1
COUGH: 0
EYE REDNESS: 0
EYE DISCHARGE: 0

## 2021-02-13 ASSESSMENT — PAIN DESCRIPTION - ORIENTATION: ORIENTATION: OTHER (COMMENT)

## 2021-02-13 ASSESSMENT — PAIN DESCRIPTION - PROGRESSION
CLINICAL_PROGRESSION: GRADUALLY WORSENING
CLINICAL_PROGRESSION: GRADUALLY WORSENING

## 2021-02-13 ASSESSMENT — PAIN SCALES - GENERAL
PAINLEVEL_OUTOF10: 8
PAINLEVEL_OUTOF10: 5
PAINLEVEL_OUTOF10: 6
PAINLEVEL_OUTOF10: 9
PAINLEVEL_OUTOF10: 7
PAINLEVEL_OUTOF10: 9
PAINLEVEL_OUTOF10: 6
PAINLEVEL_OUTOF10: 9
PAINLEVEL_OUTOF10: 8
PAINLEVEL_OUTOF10: 8

## 2021-02-13 ASSESSMENT — PAIN DESCRIPTION - DESCRIPTORS
DESCRIPTORS: SHARP;STABBING;TENDER
DESCRIPTORS: SHARP;STABBING
DESCRIPTORS: SHARP;STABBING;TENDER

## 2021-02-13 ASSESSMENT — PAIN DESCRIPTION - PAIN TYPE
TYPE: ACUTE PAIN

## 2021-02-13 ASSESSMENT — PAIN DESCRIPTION - FREQUENCY
FREQUENCY: CONTINUOUS
FREQUENCY: CONTINUOUS

## 2021-02-13 ASSESSMENT — PAIN DESCRIPTION - LOCATION
LOCATION: ABDOMEN;PELVIS;BACK
LOCATION: ABDOMEN;BACK

## 2021-02-13 ASSESSMENT — PAIN - FUNCTIONAL ASSESSMENT
PAIN_FUNCTIONAL_ASSESSMENT: ACTIVITIES ARE NOT PREVENTED
PAIN_FUNCTIONAL_ASSESSMENT: ACTIVITIES ARE NOT PREVENTED

## 2021-02-13 ASSESSMENT — PAIN DESCRIPTION - ONSET
ONSET: ON-GOING

## 2021-02-13 NOTE — PLAN OF CARE
Problem: Nausea/Vomiting:  Goal: Absence of nausea/vomiting  Description: Absence of nausea/vomiting  2/13/2021 1234 by Flavio Lebron RN  Outcome: Ongoing  2/13/2021 0331 by Twan Nazario  Outcome: Ongoing

## 2021-02-13 NOTE — H&P
[facility] History and Physical      CHIEF COMPLAINT: Abdominal pain    History of Present Illness: Patient is a 76-year-old female with no significant past medical history. She was seen in the emergency department on February 5 for right-sided abdominal pain, and diagnosed with gallstones and UTI, and discharged with a course of antibiotics. She was again seen in the emergency department on February 11 for right upper quadrant abdominal pain once more. She was noted to have an increase in her liver enzymes and was diagnosed with biliary colic and discharged home with plan to follow-up with general surgery. She has not yet seen general surgery. She presented here again today with complaints of diffuse abdominal pain. Her ED work-up was significant for lipase greater than 3000, , , bilirubin 3.5. Medicine was asked to admit for suspected gallstone pancreatitis for further work-up and specialty consultation. REVIEW OF SYSTEMS:  A comprehensive review of systems was negative except for: what is in the HPI    PMH:  History reviewed. No pertinent past medical history. Surgical History:  History reviewed. No pertinent surgical history. Medications Prior to Admission:    Prior to Admission medications    Medication Sig Start Date End Date Taking? Authorizing Provider   ondansetron (ZOFRAN ODT) 4 MG disintegrating tablet Take 1 tablet by mouth every 8 hours as needed for Nausea or Vomiting 2/11/21 2/11/22  Lisbet Chung DO   famotidine (PEPCID) 20 MG tablet Take 1 tablet by mouth 2 times daily as needed (Acid reflux) 2/11/21   Lisbet Chung DO   HYDROcodone-acetaminophen (NORCO) 5-325 MG per tablet Take 1 tablet by mouth every 6 hours as needed for Pain for up to 3 days. Intended supply: 3 days.  Take lowest dose possible to manage pain 2/11/21 2/14/21  Lisbet Chung DO   ondansetron (ZOFRAN ODT) 4 MG disintegrating tablet Take 1 tablet by mouth every 8 hours as needed for Nausea or Vomiting 2/5/21   Kathrine Morataya PA-C     Allergies:    Pcn [penicillins]    Social History:    reports that she has never smoked. She has never used smokeless tobacco. She reports previous alcohol use. She reports previous drug use. Family History:   family history is not on file. Reviewed, not pertinent to admission    PHYSICAL EXAM:  Vitals:  /82   Pulse 91   Temp 97.2 °F (36.2 °C)   Resp 18   Ht 5' 6\" (1.676 m)   Wt 125 lb (56.7 kg)   SpO2 98%   BMI 20.18 kg/m²     General Appearance: alert and oriented to person, place and time and in no acute distress  Skin: warm and dry  Head: normocephalic and atraumatic  Eyes: pupils equal, round, and reactive to light, extraocular eye movements intact, conjunctivae normal  Neck: neck supple and non tender without mass   Pulmonary/Chest: clear to auscultation bilaterally- no wheezes, rales or rhonchi, normal air movement, no respiratory distress  Cardiovascular: normal rate, normal S1 and S2 and no carotid bruits  Abdomen: soft, tenderness throughout, non-distended, normal bowel sounds, no masses or organomegaly  Extremities: no cyanosis, no clubbing and no edema  Neurologic: no cranial nerve deficit and speech normal    LABS:  Recent Labs     02/11/21 0223 02/13/21 0108    136   K 3.9 3.7    101   CO2 24 25   BUN 11 8   CREATININE 0.9 1.0   GLUCOSE 125* 134*   CALCIUM 9.0 8.9     Recent Labs     02/11/21 0223 02/13/21 0108   WBC 8.3 4.7   RBC 4.05 3.98   HGB 12.3 12.2   HCT 36.2 36.8   MCV 89.4 92.5   MCH 30.4 30.7   MCHC 34.0 33.2   RDW 13.0 13.4    145   MPV 10.0 10.2     No results for input(s): POCGLU in the last 72 hours.     CBC:   Lab Results   Component Value Date    WBC 4.7 02/13/2021    RBC 3.98 02/13/2021    HGB 12.2 02/13/2021    HCT 36.8 02/13/2021    MCV 92.5 02/13/2021    MCH 30.7 02/13/2021    MCHC 33.2 02/13/2021    RDW 13.4 02/13/2021     02/13/2021    MPV 10.2 02/13/2021     CMP:    Lab Results   Component Value Date     02/13/2021    K 3.7 02/13/2021     02/13/2021    CO2 25 02/13/2021    BUN 8 02/13/2021    CREATININE 1.0 02/13/2021    GFRAA >60 02/13/2021    LABGLOM >60 02/13/2021    GLUCOSE 134 02/13/2021    PROT 7.1 02/13/2021    LABALBU 4.3 02/13/2021    CALCIUM 8.9 02/13/2021    BILITOT 3.5 02/13/2021    ALKPHOS 113 02/13/2021     02/13/2021     02/13/2021     Radiology:   No orders to display     ASSESSMENT:      Active Problems:    Pancreatitis, unspecified pancreatitis type  Resolved Problems:    * No resolved hospital problems. *    PLAN:    Pancreatitis  Cholelithiasis  Biliary colic  Rule rule out choledocholithiasis  Patient presenting with worsening abdominal pain, lipase of 3000, elevated liver enzymes. Likely choledocholithiasis, gallstone pancreatitis, given patient's recent history of biliary colic and recent ultrasound of the gallbladder showing cholelithiasis. ED provider will consult general surgery. Patient will likely need an MRCP, ERCP. Currently she is hemodynamically stable.  -Admit to medical unit  -N.p.o.  -IV fluids  -Pain control  -GI consult    Code Status: Full  DVT prophylaxis: SCDs    NOTE: This report was transcribed using voice recognition software. Every effort was made to ensure accuracy; however, inadvertent computerized transcription errors may be present.   Electronically signed by Radha Chatman DO on 2/13/2021 at 1:59 AM

## 2021-02-13 NOTE — PLAN OF CARE
Problem: Pain:  Goal: Pain level will decrease  Description: Pain level will decrease  Outcome: Ongoing     Problem: Pain:  Goal: Control of acute pain  Description: Control of acute pain  Outcome: Ongoing     Problem: Nausea/Vomiting:  Goal: Absence of nausea/vomiting  Description: Absence of nausea/vomiting  Outcome: Ongoing     Problem: Nausea/Vomiting:  Goal: Ability to achieve adequate nutritional intake will improve  Description: Ability to achieve adequate nutritional intake will improve  Outcome: Ongoing     Problem: Nausea/Vomiting:  Goal: Able to drink  Description: Able to drink  Outcome: Not Met This Shift     Problem: Nausea/Vomiting:  Goal: Able to eat  Description: Able to eat  Outcome: Not Met This Shift     Problem: Pain:  Goal: Control of chronic pain  Description: Control of chronic pain  Outcome: Completed

## 2021-02-13 NOTE — CONSULTS
General Surgery Consult    Patient's Name/Date of Birth: Sharlon Litten / 1995    Date: February 13, 2021     Consulting Surgeon: Shanice Ballard M.D.    PCP: No primary care provider on file. Chief Complaint: epigastric pain    HPI:   Sharlon Litten is a 31 y.o. female who presents for evaluation of epigastric pain. Patient was seen in this emergency department on February 11 along with on February 5 for RUQ pain. She states it goes across her entire abdomen this time. She describes the pain as sharp with radiation to the back. States the pain is better with standing, worse with lying down. She has had significant nausea and vomiting for the past 3 days, she does have Zofran prescribed however that is not helped. She also complains of constipation last bowel movement was yesterday negative for any blood and only a little bit of hard stool. She denies any fevers, chills, recent illnesses.         History reviewed. No pertinent past medical history. History reviewed. No pertinent surgical history.     Current Facility-Administered Medications   Medication Dose Route Frequency Provider Last Rate Last Admin    lactated ringers infusion   Intravenous Continuous Marina Chin,  mL/hr at 02/13/21 1553 New Bag at 02/13/21 1553    sodium chloride flush 0.9 % injection 10 mL  10 mL Intravenous 2 times per day Marina Chin, DO        sodium chloride flush 0.9 % injection 10 mL  10 mL Intravenous PRN Marina Chin, DO        potassium chloride (KLOR-CON M) extended release tablet 40 mEq  40 mEq Oral PRN Marina Chin, DO        Or    potassium bicarb-citric acid (EFFER-K) effervescent tablet 40 mEq  40 mEq Oral PRN Marina Chin, DO        Or    potassium chloride 10 mEq/100 mL IVPB (Peripheral Line)  10 mEq Intravenous PRN Marina Chin, DO        ondansetron (ZOFRAN) injection 4 mg  4 mg Intravenous Q6H PRN Marina Chin, DO   4 mg at 02/13/21 0239    acetaminophen (TYLENOL) tablet 650 mg 650 mg Oral Q6H PRN Curly Lambing, DO        Or    acetaminophen (TYLENOL) suppository 650 mg  650 mg Rectal Q6H PRN Curly Lambing, DO        HYDROmorphone (DILAUDID) injection 1 mg  1 mg Intravenous Q2H PRN Curly Lambing, DO   1 mg at 02/13/21 1502    promethazine (PHENERGAN) injection 12.5 mg  12.5 mg Intramuscular Q6H PRN Curly Lambing, DO   12.5 mg at 02/13/21 1111    miconazole (MICOTIN) vaginal suppository 200 mg  200 mg Vaginal Daily Lei Hric, DO   200 mg at 02/13/21 1112       Allergies   Allergen Reactions    Pcn [Penicillins]        History reviewed. No pertinent family history.     Social History     Socioeconomic History    Marital status: Single     Spouse name: Not on file    Number of children: Not on file    Years of education: Not on file    Highest education level: Not on file   Occupational History    Not on file   Social Needs    Financial resource strain: Not on file    Food insecurity     Worry: Not on file     Inability: Not on file    Transportation needs     Medical: Not on file     Non-medical: Not on file   Tobacco Use    Smoking status: Never Smoker    Smokeless tobacco: Never Used   Substance and Sexual Activity    Alcohol use: Not Currently    Drug use: Not Currently    Sexual activity: Not on file   Lifestyle    Physical activity     Days per week: Not on file     Minutes per session: Not on file    Stress: Not on file   Relationships    Social connections     Talks on phone: Not on file     Gets together: Not on file     Attends Buddhist service: Not on file     Active member of club or organization: Not on file     Attends meetings of clubs or organizations: Not on file     Relationship status: Not on file    Intimate partner violence     Fear of current or ex partner: Not on file     Emotionally abused: Not on file     Physically abused: Not on file     Forced sexual activity: Not on file   Other Topics Concern    Not on file   Social History Narrative Latest Ref Range: 32.0 - 34.5 % 33.2   MPV Latest Ref Range: 7.0 - 12.0 fL 10.2   RDW Latest Ref Range: 11.5 - 15.0 fL 13.4   Platelet Count Latest Ref Range: 130 - 450 E9/L 145   Neutrophils % Latest Ref Range: 43.0 - 80.0 % 70.2   Immature Granulocytes % Latest Ref Range: 0.0 - 5.0 % 0.2   Lymphocyte % Latest Ref Range: 20.0 - 42.0 % 18.7 (L)   Monocytes % Latest Ref Range: 2.0 - 12.0 % 8.6   Eosinophils % Latest Ref Range: 0.0 - 6.0 % 1.9   Basophils % Latest Ref Range: 0.0 - 2.0 % 0.4   Neutrophils Absolute Latest Ref Range: 1.80 - 7.30 E9/L 3.27   Immature Granulocytes # Latest Units: E9/L 0.01   Lymphocytes Absolute Latest Ref Range: 1.50 - 4.00 E9/L 0.87 (L)   Monocytes Absolute Latest Ref Range: 0.10 - 0.95 E9/L 0.40   Eosinophils Absolute Latest Ref Range: 0.05 - 0.50 E9/L 0.09   Basophils Absolute Latest Ref Range: 0.00 - 0.20 E9/L 0.02   Color, UA Latest Ref Range: Straw/Yellow  Yellow   Clarity, UA Latest Ref Range: Clear  Clear   Glucose, UA Latest Ref Range: Negative mg/dL Negative   Bilirubin, Urine Latest Ref Range: Negative  MODERATE (A)   Ketones, Urine Latest Ref Range: Negative mg/dL Negative   Specific Gravity, UA Latest Ref Range: 1.005 - 1.030  1.010   Blood, Urine Latest Ref Range: Negative  Negative   pH, UA Latest Ref Range: 5.0 - 9.0  5.5   Protein, UA Latest Ref Range: Negative mg/dL Negative   Urobilinogen, Urine Latest Ref Range: <2.0 E.U./dL 1.0   Nitrite, Urine Latest Ref Range: Negative  Negative   Leukocyte Esterase, Urine Latest Ref Range: Negative  SMALL (A)   WBC, UA Latest Ref Range: 0 - 5 /HPF 2-5   RBC, UA Latest Ref Range: 0 - 2 /HPF NONE   Epithelial Cells, UA Latest Units: /HPF FEW   Bacteria, UA Latest Ref Range: None Seen /HPF NONE SEEN       Radiology:  RUQ U/S     Impression   1. Cholelithiasis. Barrington Lapping is no evidence of acute cholecystitis. Assessment:  31 y.o. female with gallstone pancreatits    Plan:   Bowel rest  Follow lipase  Once lipase close to normal and abdominal pain improved, will need cholecystectomy with IOC.       Shazia Sanchez MD  2/13/2021  4:43 PM

## 2021-02-13 NOTE — ED PROVIDER NOTES
29-year-old female who has been seen in this emergency department on February 11 along with on February 5. Presents to ED with complaint of abdominal pain. States it goes across her entire abdomen. States since she has been in here on February 11 the pain has not gotten any better and she has not been able to tolerate any food or water. States the pain is constant dull in nature however she does get sharp attacks that come and go. States the pain is better with standing, worse with lying down. She has had significant nausea and vomiting for the past 3 days, she does have Zofran prescribed however that is not helped. She does have an appointment scheduled with Dr. Makayla Bahena general surgeon on Wednesday and has not seen him yet for this issue. She does also elicit having a recent urinary tract infection and has completed antibiotics for that however still has itching with urination. She also complains of constipation last bowel movement was yesterday negative for any blood and only a little bit of hard stool. Otherwise patient has no other complaints denies any fevers, chills, recent illnesses. The patient presents with abdominal pain that has been going on for 9 days. These symptoms are moderate in severity. Symptoms are made better by standing up. Symptoms are made worse by laying down. Associated symptoms include nausea, vomiting, constipation, itching with urination. The history is provided by the patient and medical records. Review of Systems   Constitutional: Negative for chills and fever. HENT: Negative for ear pain, sinus pressure and sore throat. Eyes: Negative for pain, discharge and redness. Respiratory: Negative for cough, shortness of breath and wheezing. Cardiovascular: Negative for chest pain. Gastrointestinal: Positive for abdominal pain, constipation, nausea and vomiting. Negative for abdominal distention and diarrhea.    Genitourinary: Negative for dysuria and frequency. Musculoskeletal: Negative for arthralgias and back pain. Skin: Negative for rash and wound. Neurological: Negative for weakness and headaches. Hematological: Negative for adenopathy. All other systems reviewed and are negative. Physical Exam  Vitals signs and nursing note reviewed. Constitutional:       Appearance: Normal appearance. She is normal weight. HENT:      Head: Normocephalic and atraumatic. Eyes:      General: No scleral icterus. Conjunctiva/sclera: Conjunctivae normal.   Cardiovascular:      Rate and Rhythm: Normal rate and regular rhythm. Pulses: Normal pulses. Heart sounds: Normal heart sounds. No murmur. No gallop. Pulmonary:      Effort: Pulmonary effort is normal. No respiratory distress. Breath sounds: Normal breath sounds. No wheezing or rales. Abdominal:      General: Abdomen is flat. Bowel sounds are normal. There is no distension. Palpations: Abdomen is soft. Tenderness: There is abdominal tenderness in the right upper quadrant, epigastric area, suprapubic area, left upper quadrant and left lower quadrant. There is no guarding. Positive signs include Calhoun's sign. Negative signs include Rovsing's sign and McBurney's sign. Musculoskeletal:         General: No swelling, tenderness or deformity. Skin:     General: Skin is warm and dry. Capillary Refill: Capillary refill takes less than 2 seconds. Coloration: Skin is not cyanotic. Neurological:      General: No focal deficit present. Mental Status: She is alert and oriented to person, place, and time. Psychiatric:         Mood and Affect: Mood normal.         Thought Content: Thought content normal.          Procedures     MDM  Number of Diagnoses or Management Options  Acute gallstone pancreatitis  Diagnosis management comments: 50-year-old female presents ED with complaint of abdominal pain.   She had been seen previously on February 5 and February 11 for similar complaints. Previous ultrasound and CAT scan on previous visits which did show her to have gallstones. To the patient's complaints did get lab work started on the patient. Patient's lab work was significant for elevated LFTs and lipase. Due to this concern is for patient have gallstone pancreatitis. Patient's pain was controlled here in the emergency department with fentanyl and morphine along with given the patient nausea medication as well. Due to this work-up decision was made to meet the patient at this time. Amount and/or Complexity of Data Reviewed  Clinical lab tests: reviewed  Decide to obtain previous medical records or to obtain history from someone other than the patient: yes                  --------------------------------------------- PAST HISTORY ---------------------------------------------  Past Medical History:  has no past medical history on file. Past Surgical History:  has no past surgical history on file. Social History:  reports that she has never smoked. She has never used smokeless tobacco. She reports previous alcohol use. She reports previous drug use. Family History: family history is not on file. The patients home medications have been reviewed.     Allergies: Pcn [penicillins]    -------------------------------------------------- RESULTS -------------------------------------------------    LABS:  Results for orders placed or performed during the hospital encounter of 02/13/21   CBC Auto Differential   Result Value Ref Range    WBC 4.7 4.5 - 11.5 E9/L    RBC 3.98 3.50 - 5.50 E12/L    Hemoglobin 12.2 11.5 - 15.5 g/dL    Hematocrit 36.8 34.0 - 48.0 %    MCV 92.5 80.0 - 99.9 fL    MCH 30.7 26.0 - 35.0 pg    MCHC 33.2 32.0 - 34.5 %    RDW 13.4 11.5 - 15.0 fL    Platelets 308 190 - 629 E9/L    MPV 10.2 7.0 - 12.0 fL    Neutrophils % 70.2 43.0 - 80.0 %    Immature Granulocytes % 0.2 0.0 - 5.0 %    Lymphocytes % 18.7 (L) 20.0 - 42.0 %    Monocytes % 8.6 2.0 - 12.0 %    Eosinophils % 1.9 0.0 - 6.0 %    Basophils % 0.4 0.0 - 2.0 %    Neutrophils Absolute 3.27 1.80 - 7.30 E9/L    Immature Granulocytes # 0.01 E9/L    Lymphocytes Absolute 0.87 (L) 1.50 - 4.00 E9/L    Monocytes Absolute 0.40 0.10 - 0.95 E9/L    Eosinophils Absolute 0.09 0.05 - 0.50 E9/L    Basophils Absolute 0.02 0.00 - 0.20 E9/L   Comprehensive Metabolic Panel w/ Reflex to MG   Result Value Ref Range    Sodium 136 132 - 146 mmol/L    Potassium reflex Magnesium 3.7 3.5 - 5.0 mmol/L    Chloride 101 98 - 107 mmol/L    CO2 25 22 - 29 mmol/L    Anion Gap 10 7 - 16 mmol/L    Glucose 134 (H) 74 - 99 mg/dL    BUN 8 6 - 20 mg/dL    CREATININE 1.0 0.5 - 1.0 mg/dL    GFR Non-African American >60 >=60 mL/min/1.73    GFR African American >60     Calcium 8.9 8.6 - 10.2 mg/dL    Total Protein 7.1 6.4 - 8.3 g/dL    Albumin 4.3 3.5 - 5.2 g/dL    Total Bilirubin 3.5 (H) 0.0 - 1.2 mg/dL    Alkaline Phosphatase 113 (H) 35 - 104 U/L     (H) 0 - 32 U/L     (H) 0 - 31 U/L   Lipase   Result Value Ref Range    Lipase >3,000 (H) 13 - 60 U/L   Urinalysis, reflex to microscopic   Result Value Ref Range    Color, UA Yellow Straw/Yellow    Clarity, UA Clear Clear    Glucose, Ur Negative Negative mg/dL    Bilirubin Urine MODERATE (A) Negative    Ketones, Urine Negative Negative mg/dL    Specific Gravity, UA 1.010 1.005 - 1.030    Blood, Urine Negative Negative    pH, UA 5.5 5.0 - 9.0    Protein, UA Negative Negative mg/dL    Urobilinogen, Urine 1.0 <2.0 E.U./dL    Nitrite, Urine Negative Negative    Leukocyte Esterase, Urine SMALL (A) Negative   Microscopic Urinalysis   Result Value Ref Range    WBC, UA 2-5 0 - 5 /HPF    RBC, UA NONE 0 - 2 /HPF    Epithelial Cells, UA FEW /HPF    Bacteria, UA NONE SEEN None Seen /HPF       RADIOLOGY:  No orders to display         ------------------------- NURSING NOTES AND VITALS REVIEWED ---------------------------  Date / Time Roomed:  2/13/2021 12:45 AM  ED Bed Assignment:  28/28    The nursing notes within the ED encounter and vital signs as below have been reviewed. Patient Vitals for the past 24 hrs:   BP Temp Pulse Resp SpO2 Height Weight   02/13/21 0054 121/82 -- -- -- -- -- --   02/13/21 0043 -- 97.2 °F (36.2 °C) 91 18 98 % 5' 6\" (1.676 m) 125 lb (56.7 kg)       Oxygen Saturation Interpretation: Normal    ------------------------------------------ PROGRESS NOTES ------------------------------------------  Re-evaluation(s):  Time: 0155  Patients symptoms show no change  Repeat physical examination is not changed    Counseling:  I have spoken with the patient and discussed todays results, in addition to providing specific details for the plan of care and counseling regarding the diagnosis and prognosis. Their questions are answered at this time and they are agreeable with the plan of admission.    --------------------------------- ADDITIONAL PROVIDER NOTES ---------------------------------  Consultations:  Time: 0200. Spoke with Dr. Ellaree Bumpers. Discussed case. They will admit the patient. This patient's ED course included: a personal history and physicial examination, re-evaluation prior to disposition, multiple bedside re-evaluations, IV medications, cardiac monitoring and continuous pulse oximetry    This patient has remained hemodynamically stable during their ED course. Diagnosis:  1. Acute gallstone pancreatitis        Disposition:  Patient's disposition: Admit to med/surg floor  Patient's condition is stable.          Evy Wing MD  Resident  02/13/21 5120

## 2021-02-14 LAB
ALBUMIN SERPL-MCNC: 3.4 G/DL (ref 3.5–5.2)
ALP BLD-CCNC: 103 U/L (ref 35–104)
ALT SERPL-CCNC: 289 U/L (ref 0–32)
ANION GAP SERPL CALCULATED.3IONS-SCNC: 12 MMOL/L (ref 7–16)
AST SERPL-CCNC: 174 U/L (ref 0–31)
BILIRUB SERPL-MCNC: 3.9 MG/DL (ref 0–1.2)
BILIRUBIN DIRECT: 2.9 MG/DL (ref 0–0.3)
BUN BLDV-MCNC: 6 MG/DL (ref 6–20)
CALCIUM SERPL-MCNC: 8.1 MG/DL (ref 8.6–10.2)
CHLORIDE BLD-SCNC: 102 MMOL/L (ref 98–107)
CO2: 20 MMOL/L (ref 22–29)
CREAT SERPL-MCNC: 0.6 MG/DL (ref 0.5–1)
GFR AFRICAN AMERICAN: >60
GFR NON-AFRICAN AMERICAN: >60 ML/MIN/1.73
GLUCOSE BLD-MCNC: 75 MG/DL (ref 74–99)
HCT VFR BLD CALC: 33.4 % (ref 34–48)
HEMOGLOBIN: 10.6 G/DL (ref 11.5–15.5)
LIPASE: 2932 U/L (ref 13–60)
MCH RBC QN AUTO: 30.1 PG (ref 26–35)
MCHC RBC AUTO-ENTMCNC: 31.7 % (ref 32–34.5)
MCV RBC AUTO: 94.9 FL (ref 80–99.9)
PDW BLD-RTO: 13.5 FL (ref 11.5–15)
PLATELET # BLD: 140 E9/L (ref 130–450)
PMV BLD AUTO: 10.1 FL (ref 7–12)
POTASSIUM REFLEX MAGNESIUM: 3.8 MMOL/L (ref 3.5–5)
RBC # BLD: 3.52 E12/L (ref 3.5–5.5)
SODIUM BLD-SCNC: 134 MMOL/L (ref 132–146)
TOTAL PROTEIN: 5.6 G/DL (ref 6.4–8.3)
WBC # BLD: 8.1 E9/L (ref 4.5–11.5)

## 2021-02-14 PROCEDURE — 6360000002 HC RX W HCPCS: Performed by: INTERNAL MEDICINE

## 2021-02-14 PROCEDURE — 36415 COLL VENOUS BLD VENIPUNCTURE: CPT

## 2021-02-14 PROCEDURE — 83690 ASSAY OF LIPASE: CPT

## 2021-02-14 PROCEDURE — 2580000003 HC RX 258: Performed by: INTERNAL MEDICINE

## 2021-02-14 PROCEDURE — 6370000000 HC RX 637 (ALT 250 FOR IP): Performed by: INTERNAL MEDICINE

## 2021-02-14 PROCEDURE — 80053 COMPREHEN METABOLIC PANEL: CPT

## 2021-02-14 PROCEDURE — 99232 SBSQ HOSP IP/OBS MODERATE 35: CPT | Performed by: INTERNAL MEDICINE

## 2021-02-14 PROCEDURE — 82248 BILIRUBIN DIRECT: CPT

## 2021-02-14 PROCEDURE — 85027 COMPLETE CBC AUTOMATED: CPT

## 2021-02-14 PROCEDURE — 1200000000 HC SEMI PRIVATE

## 2021-02-14 RX ADMIN — ONDANSETRON 4 MG: 2 INJECTION INTRAMUSCULAR; INTRAVENOUS at 17:21

## 2021-02-14 RX ADMIN — HYDROMORPHONE HYDROCHLORIDE 1 MG: 1 INJECTION, SOLUTION INTRAMUSCULAR; INTRAVENOUS; SUBCUTANEOUS at 08:16

## 2021-02-14 RX ADMIN — HYDROMORPHONE HYDROCHLORIDE 1 MG: 1 INJECTION, SOLUTION INTRAMUSCULAR; INTRAVENOUS; SUBCUTANEOUS at 23:40

## 2021-02-14 RX ADMIN — HYDROMORPHONE HYDROCHLORIDE 1 MG: 1 INJECTION, SOLUTION INTRAMUSCULAR; INTRAVENOUS; SUBCUTANEOUS at 11:04

## 2021-02-14 RX ADMIN — ONDANSETRON 4 MG: 2 INJECTION INTRAMUSCULAR; INTRAVENOUS at 11:04

## 2021-02-14 RX ADMIN — SODIUM CHLORIDE, POTASSIUM CHLORIDE, SODIUM LACTATE AND CALCIUM CHLORIDE: 600; 310; 30; 20 INJECTION, SOLUTION INTRAVENOUS at 12:26

## 2021-02-14 RX ADMIN — HYDROMORPHONE HYDROCHLORIDE 1 MG: 1 INJECTION, SOLUTION INTRAMUSCULAR; INTRAVENOUS; SUBCUTANEOUS at 21:06

## 2021-02-14 RX ADMIN — MICONAZOLE NITRATE 200 MG: 200 SUPPOSITORY VAGINAL at 11:04

## 2021-02-14 RX ADMIN — SODIUM CHLORIDE, PRESERVATIVE FREE 10 ML: 5 INJECTION INTRAVENOUS at 13:36

## 2021-02-14 RX ADMIN — HYDROMORPHONE HYDROCHLORIDE 1 MG: 1 INJECTION, SOLUTION INTRAMUSCULAR; INTRAVENOUS; SUBCUTANEOUS at 05:13

## 2021-02-14 RX ADMIN — HYDROMORPHONE HYDROCHLORIDE 1 MG: 1 INJECTION, SOLUTION INTRAMUSCULAR; INTRAVENOUS; SUBCUTANEOUS at 01:33

## 2021-02-14 RX ADMIN — HYDROMORPHONE HYDROCHLORIDE 1 MG: 1 INJECTION, SOLUTION INTRAMUSCULAR; INTRAVENOUS; SUBCUTANEOUS at 15:57

## 2021-02-14 RX ADMIN — ONDANSETRON 4 MG: 2 INJECTION INTRAMUSCULAR; INTRAVENOUS at 05:13

## 2021-02-14 RX ADMIN — ACETAMINOPHEN 650 MG: 325 TABLET ORAL at 21:13

## 2021-02-14 RX ADMIN — Medication 10 ML: at 10:48

## 2021-02-14 RX ADMIN — HYDROMORPHONE HYDROCHLORIDE 1 MG: 1 INJECTION, SOLUTION INTRAMUSCULAR; INTRAVENOUS; SUBCUTANEOUS at 13:36

## 2021-02-14 RX ADMIN — ONDANSETRON 4 MG: 2 INJECTION INTRAMUSCULAR; INTRAVENOUS at 23:40

## 2021-02-14 RX ADMIN — HYDROMORPHONE HYDROCHLORIDE 1 MG: 1 INJECTION, SOLUTION INTRAMUSCULAR; INTRAVENOUS; SUBCUTANEOUS at 18:38

## 2021-02-14 ASSESSMENT — PAIN DESCRIPTION - ONSET
ONSET: ON-GOING

## 2021-02-14 ASSESSMENT — PAIN DESCRIPTION - FREQUENCY
FREQUENCY: CONTINUOUS

## 2021-02-14 ASSESSMENT — PAIN SCALES - GENERAL
PAINLEVEL_OUTOF10: 9
PAINLEVEL_OUTOF10: 7
PAINLEVEL_OUTOF10: 0
PAINLEVEL_OUTOF10: 9

## 2021-02-14 ASSESSMENT — PAIN DESCRIPTION - PAIN TYPE
TYPE: ACUTE PAIN

## 2021-02-14 ASSESSMENT — PAIN DESCRIPTION - DESCRIPTORS
DESCRIPTORS: ACHING;DISCOMFORT
DESCRIPTORS: ACHING;DISCOMFORT;CONSTANT

## 2021-02-14 ASSESSMENT — PAIN DESCRIPTION - LOCATION
LOCATION: ABDOMEN

## 2021-02-14 ASSESSMENT — PAIN DESCRIPTION - ORIENTATION
ORIENTATION: MID

## 2021-02-14 NOTE — PROGRESS NOTES
I had been asked to see her for pancreatitis but surgery saw her and picture c/w gallstone pancreatitis and plan for surgery outlined by Dr Fleet Apgar. I have nothing to add so will defer on the consult.

## 2021-02-14 NOTE — PROGRESS NOTES
Pt seen and examined by night physician who admitted pt.    Chart reviewed and updated by nursing  Surgery following pt

## 2021-02-15 ENCOUNTER — APPOINTMENT (OUTPATIENT)
Dept: MRI IMAGING | Age: 26
DRG: 282 | End: 2021-02-15
Payer: MEDICAID

## 2021-02-15 LAB
ALBUMIN SERPL-MCNC: 3.2 G/DL (ref 3.5–5.2)
ALP BLD-CCNC: 100 U/L (ref 35–104)
ALT SERPL-CCNC: 201 U/L (ref 0–32)
ANION GAP SERPL CALCULATED.3IONS-SCNC: 10 MMOL/L (ref 7–16)
AST SERPL-CCNC: 91 U/L (ref 0–31)
BILIRUB SERPL-MCNC: 2 MG/DL (ref 0–1.2)
BILIRUBIN DIRECT: 1.2 MG/DL (ref 0–0.3)
BILIRUBIN, INDIRECT: 0.8 MG/DL (ref 0–1)
BUN BLDV-MCNC: 4 MG/DL (ref 6–20)
CALCIUM SERPL-MCNC: 8.1 MG/DL (ref 8.6–10.2)
CHLORIDE BLD-SCNC: 101 MMOL/L (ref 98–107)
CO2: 22 MMOL/L (ref 22–29)
CREAT SERPL-MCNC: 0.6 MG/DL (ref 0.5–1)
GFR AFRICAN AMERICAN: >60
GFR NON-AFRICAN AMERICAN: >60 ML/MIN/1.73
GLUCOSE BLD-MCNC: 60 MG/DL (ref 74–99)
HCT VFR BLD CALC: 30 % (ref 34–48)
HEMOGLOBIN: 9.8 G/DL (ref 11.5–15.5)
LIPASE: 404 U/L (ref 13–60)
MCH RBC QN AUTO: 31.1 PG (ref 26–35)
MCHC RBC AUTO-ENTMCNC: 32.7 % (ref 32–34.5)
MCV RBC AUTO: 95.2 FL (ref 80–99.9)
PDW BLD-RTO: 13.7 FL (ref 11.5–15)
PLATELET # BLD: 126 E9/L (ref 130–450)
PMV BLD AUTO: 10.3 FL (ref 7–12)
POTASSIUM SERPL-SCNC: 3.8 MMOL/L (ref 3.5–5)
RBC # BLD: 3.15 E12/L (ref 3.5–5.5)
SODIUM BLD-SCNC: 133 MMOL/L (ref 132–146)
TOTAL PROTEIN: 5.9 G/DL (ref 6.4–8.3)
WBC # BLD: 6.9 E9/L (ref 4.5–11.5)

## 2021-02-15 PROCEDURE — A9579 GAD-BASE MR CONTRAST NOS,1ML: HCPCS | Performed by: RADIOLOGY

## 2021-02-15 PROCEDURE — 6370000000 HC RX 637 (ALT 250 FOR IP): Performed by: INTERNAL MEDICINE

## 2021-02-15 PROCEDURE — 99232 SBSQ HOSP IP/OBS MODERATE 35: CPT | Performed by: INTERNAL MEDICINE

## 2021-02-15 PROCEDURE — 6360000002 HC RX W HCPCS: Performed by: INTERNAL MEDICINE

## 2021-02-15 PROCEDURE — 6360000004 HC RX CONTRAST MEDICATION: Performed by: RADIOLOGY

## 2021-02-15 PROCEDURE — 2580000003 HC RX 258: Performed by: INTERNAL MEDICINE

## 2021-02-15 PROCEDURE — 6370000000 HC RX 637 (ALT 250 FOR IP): Performed by: STUDENT IN AN ORGANIZED HEALTH CARE EDUCATION/TRAINING PROGRAM

## 2021-02-15 PROCEDURE — 1200000000 HC SEMI PRIVATE

## 2021-02-15 PROCEDURE — 85027 COMPLETE CBC AUTOMATED: CPT

## 2021-02-15 PROCEDURE — 83690 ASSAY OF LIPASE: CPT

## 2021-02-15 PROCEDURE — 36415 COLL VENOUS BLD VENIPUNCTURE: CPT

## 2021-02-15 PROCEDURE — 80048 BASIC METABOLIC PNL TOTAL CA: CPT

## 2021-02-15 PROCEDURE — 80076 HEPATIC FUNCTION PANEL: CPT

## 2021-02-15 PROCEDURE — 74183 MRI ABD W/O CNTR FLWD CNTR: CPT

## 2021-02-15 RX ORDER — SENNA AND DOCUSATE SODIUM 50; 8.6 MG/1; MG/1
2 TABLET, FILM COATED ORAL DAILY
Status: DISCONTINUED | OUTPATIENT
Start: 2021-02-15 | End: 2021-02-18 | Stop reason: HOSPADM

## 2021-02-15 RX ORDER — POLYETHYLENE GLYCOL 3350 17 G/17G
17 POWDER, FOR SOLUTION ORAL DAILY
Status: DISCONTINUED | OUTPATIENT
Start: 2021-02-15 | End: 2021-02-18 | Stop reason: HOSPADM

## 2021-02-15 RX ORDER — DEXTROSE AND SODIUM CHLORIDE 5; .9 G/100ML; G/100ML
INJECTION, SOLUTION INTRAVENOUS CONTINUOUS
Status: DISCONTINUED | OUTPATIENT
Start: 2021-02-15 | End: 2021-02-18 | Stop reason: HOSPADM

## 2021-02-15 RX ADMIN — HYDROMORPHONE HYDROCHLORIDE 1 MG: 1 INJECTION, SOLUTION INTRAMUSCULAR; INTRAVENOUS; SUBCUTANEOUS at 02:32

## 2021-02-15 RX ADMIN — ACETAMINOPHEN 650 MG: 325 TABLET ORAL at 23:14

## 2021-02-15 RX ADMIN — HYDROMORPHONE HYDROCHLORIDE 1 MG: 1 INJECTION, SOLUTION INTRAMUSCULAR; INTRAVENOUS; SUBCUTANEOUS at 19:40

## 2021-02-15 RX ADMIN — Medication 10 ML: at 19:40

## 2021-02-15 RX ADMIN — HYDROMORPHONE HYDROCHLORIDE 1 MG: 1 INJECTION, SOLUTION INTRAMUSCULAR; INTRAVENOUS; SUBCUTANEOUS at 13:46

## 2021-02-15 RX ADMIN — HYDROMORPHONE HYDROCHLORIDE 1 MG: 1 INJECTION, SOLUTION INTRAMUSCULAR; INTRAVENOUS; SUBCUTANEOUS at 23:14

## 2021-02-15 RX ADMIN — PROMETHAZINE HYDROCHLORIDE 12.5 MG: 25 INJECTION INTRAMUSCULAR; INTRAVENOUS at 21:24

## 2021-02-15 RX ADMIN — DEXTROSE AND SODIUM CHLORIDE: 5; 900 INJECTION, SOLUTION INTRAVENOUS at 17:12

## 2021-02-15 RX ADMIN — DOCUSATE SODIUM AND SENNOSIDES 2 TABLET: 8.6; 5 TABLET, FILM COATED ORAL at 09:26

## 2021-02-15 RX ADMIN — ONDANSETRON 4 MG: 2 INJECTION INTRAMUSCULAR; INTRAVENOUS at 05:53

## 2021-02-15 RX ADMIN — POLYETHYLENE GLYCOL 3350 17 G: 17 POWDER, FOR SOLUTION ORAL at 09:26

## 2021-02-15 RX ADMIN — HYDROMORPHONE HYDROCHLORIDE 1 MG: 1 INJECTION, SOLUTION INTRAMUSCULAR; INTRAVENOUS; SUBCUTANEOUS at 07:46

## 2021-02-15 RX ADMIN — ACETAMINOPHEN 650 MG: 325 TABLET ORAL at 03:41

## 2021-02-15 RX ADMIN — ACETAMINOPHEN 650 MG: 325 TABLET ORAL at 10:12

## 2021-02-15 RX ADMIN — ONDANSETRON 4 MG: 2 INJECTION INTRAMUSCULAR; INTRAVENOUS at 17:02

## 2021-02-15 RX ADMIN — HYDROMORPHONE HYDROCHLORIDE 1 MG: 1 INJECTION, SOLUTION INTRAMUSCULAR; INTRAVENOUS; SUBCUTANEOUS at 10:11

## 2021-02-15 RX ADMIN — HYDROMORPHONE HYDROCHLORIDE 1 MG: 1 INJECTION, SOLUTION INTRAMUSCULAR; INTRAVENOUS; SUBCUTANEOUS at 17:02

## 2021-02-15 RX ADMIN — HYDROMORPHONE HYDROCHLORIDE 1 MG: 1 INJECTION, SOLUTION INTRAMUSCULAR; INTRAVENOUS; SUBCUTANEOUS at 04:56

## 2021-02-15 RX ADMIN — SODIUM CHLORIDE, PRESERVATIVE FREE 10 ML: 5 INJECTION INTRAVENOUS at 17:02

## 2021-02-15 RX ADMIN — ACETAMINOPHEN 650 MG: 325 TABLET ORAL at 17:02

## 2021-02-15 RX ADMIN — GADOTERIDOL 11 ML: 279.3 INJECTION, SOLUTION INTRAVENOUS at 11:10

## 2021-02-15 RX ADMIN — DEXTROSE AND SODIUM CHLORIDE: 5; 900 INJECTION, SOLUTION INTRAVENOUS at 09:26

## 2021-02-15 RX ADMIN — ONDANSETRON 4 MG: 2 INJECTION INTRAMUSCULAR; INTRAVENOUS at 23:14

## 2021-02-15 RX ADMIN — MICONAZOLE NITRATE 200 MG: 200 SUPPOSITORY VAGINAL at 09:33

## 2021-02-15 RX ADMIN — Medication 10 ML: at 09:27

## 2021-02-15 ASSESSMENT — PAIN SCALES - GENERAL
PAINLEVEL_OUTOF10: 8
PAINLEVEL_OUTOF10: 3
PAINLEVEL_OUTOF10: 8
PAINLEVEL_OUTOF10: 5
PAINLEVEL_OUTOF10: 8
PAINLEVEL_OUTOF10: 8
PAINLEVEL_OUTOF10: 9
PAINLEVEL_OUTOF10: 8

## 2021-02-15 ASSESSMENT — PAIN DESCRIPTION - FREQUENCY
FREQUENCY: CONTINUOUS
FREQUENCY: CONTINUOUS

## 2021-02-15 ASSESSMENT — PAIN DESCRIPTION - ORIENTATION
ORIENTATION: MID

## 2021-02-15 ASSESSMENT — PAIN DESCRIPTION - LOCATION
LOCATION: ABDOMEN
LOCATION: ABDOMEN

## 2021-02-15 ASSESSMENT — PAIN DESCRIPTION - ONSET
ONSET: ON-GOING
ONSET: ON-GOING

## 2021-02-15 ASSESSMENT — PAIN DESCRIPTION - PAIN TYPE
TYPE: ACUTE PAIN
TYPE: ACUTE PAIN

## 2021-02-15 ASSESSMENT — PAIN DESCRIPTION - DESCRIPTORS
DESCRIPTORS: ACHING;DISCOMFORT
DESCRIPTORS: ACHING
DESCRIPTORS: ACHING;DISCOMFORT

## 2021-02-15 ASSESSMENT — PAIN DESCRIPTION - PROGRESSION
CLINICAL_PROGRESSION: GRADUALLY WORSENING

## 2021-02-15 NOTE — PROGRESS NOTES
General Surgery Progress Note    Surgeon:  Dr. Fleet Apgar  Subjective:   Pain:    Still with epigastric pain  Diet:    NPO  Nausea: Yes  BM/Flatus: None    BP (!) 101/59   Pulse 92   Temp 98.6 °F (37 °C) (Oral)   Resp 16   Ht 5' 6\" (1.676 m)   Wt 125 lb (56.7 kg)   LMP 01/15/2021 (Exact Date)   SpO2 95%   BMI 20.18 kg/m²      General:  no acute distress  Lungs:  non-labored breathing  Heart:   Pulse is regular  Abdomen:  soft, tender to palpation epigastrium, nondistended, no guarding/rebound tenderness    ASSESSMENT / PLAN:   · Gallstone pancreatitis  · Lipase still very elevated  · Check direct bili as T bili elevated, may need MRCP to rule out persistant choledocholithiasis    Keith Peterson MD  2021  9:12 PM

## 2021-02-15 NOTE — CARE COORDINATION
Met with patient about diagnosis and discharge plan of care. Pt lives with boyfriend, independent prior to admit. Pt NPO for MRCP. Pt has no PCP. Will place PCP # on discharge instructions for pt to find a PCP.  No other needs-MJO

## 2021-02-15 NOTE — PROGRESS NOTES
[facility]   Progress Note    Admitting Date and Time: 2/13/2021 12:45 AM  Admit Dx: Pancreatitis, unspecified pancreatitis type [K85.90]    Subjective:    Patient was admitted with Pancreatitis, unspecified pancreatitis type [K85.90]. Patient denies fever, chills, cp, sob, vomiting.  Pt c/o abd pain and some nausea     sodium chloride flush  10 mL Intravenous 2 times per day    miconazole  200 mg Vaginal Daily         sodium chloride flush, 10 mL, PRN      potassium chloride, 40 mEq, PRN    Or      potassium alternative oral replacement, 40 mEq, PRN    Or      potassium chloride, 10 mEq, PRN      ondansetron, 4 mg, Q6H PRN      acetaminophen, 650 mg, Q6H PRN    Or      acetaminophen, 650 mg, Q6H PRN      HYDROmorphone, 1 mg, Q2H PRN      promethazine, 12.5 mg, Q6H PRN         Objective:    BP (!) 101/59   Pulse 92   Temp 98.6 °F (37 °C) (Oral)   Resp 16   Ht 5' 6\" (1.676 m)   Wt 125 lb (56.7 kg)   LMP 01/15/2021 (Exact Date)   SpO2 95%   BMI 20.18 kg/m²   Skin: warm and dry, no rash or erythema  Pulmonary/Chest: clear to auscultation bilaterally- no wheezes, rales or rhonchi, normal air movement, no respiratory distress  Cardiovascular: rhythm reg at rate of 94  Abdomen: pos bs soft mod tenderness mid epigastric  No rebound, rigidity, or guarding  Extremities: no cyanosis, no clubbing and no edema      Recent Labs     02/13/21 0108 02/14/21 0251    134   K 3.7 3.8    102   CO2 25 20*   BUN 8 6   CREATININE 1.0 0.6   GLUCOSE 134* 75   CALCIUM 8.9 8.1*       Recent Labs     02/13/21 0108 02/14/21 0251   WBC 4.7 8.1   RBC 3.98 3.52   HGB 12.2 10.6*   HCT 36.8 33.4*   MCV 92.5 94.9   MCH 30.7 30.1   MCHC 33.2 31.7*   RDW 13.4 13.5    140   MPV 10.2 10.1       CBC:   Lab Results   Component Value Date    WBC 8.1 02/14/2021    RBC 3.52 02/14/2021    HGB 10.6 02/14/2021    HCT 33.4 02/14/2021    MCV 94.9 02/14/2021    MCH 30.1 02/14/2021    MCHC 31.7 02/14/2021    RDW 13.5 02/14/2021     02/14/2021    MPV 10.1 02/14/2021     CMP:    Lab Results   Component Value Date     02/14/2021    K 3.8 02/14/2021     02/14/2021    CO2 20 02/14/2021    BUN 6 02/14/2021    CREATININE 0.6 02/14/2021    GFRAA >60 02/14/2021    LABGLOM >60 02/14/2021    GLUCOSE 75 02/14/2021    PROT 5.6 02/14/2021    LABALBU 3.4 02/14/2021    CALCIUM 8.1 02/14/2021    BILITOT 3.9 02/14/2021    ALKPHOS 103 02/14/2021     02/14/2021     02/14/2021     LIPASE:    Lab Results   Component Value Date    LIPASE 2,932 02/14/2021        Radiology:   MRI ABDOMEN W WO CONTRAST MRCP    (Results Pending)       Assessment:    Active Problems:    Pancreatitis, unspecified pancreatitis type  Resolved Problems:    * No resolved hospital problems. *      Plan:  1. Pancreatitis likely due to gallstones  Npo surgery following monitor pt and labs  Symptom control with meds  2. Cholelithiasis cholecystectomy with IOC per surgery once lipase improved  3. Acidosis monitor  4.  Dehydration continue iv fluids        Electronically signed by Shanthi Medrano DO on 2/14/2021 at 9:33 PM

## 2021-02-15 NOTE — PROGRESS NOTES
[facility] order received to advance her to clear liquids.

## 2021-02-15 NOTE — PROGRESS NOTES
GENERAL SURGERY  DAILY PROGRESS NOTE  2/15/2021    Subjective:  C/o nausea that she attributes to pain meds. States still having pain but is less than yesterday. Passing flatus, no BM for several days. Denies emesis    Objective:  BP (!) 96/58   Pulse 84   Temp 98.3 °F (36.8 °C) (Oral)   Resp 16   Ht 5' 6\" (1.676 m)   Wt 125 lb (56.7 kg)   LMP 01/15/2021 (Exact Date)   SpO2 98%   BMI 20.18 kg/m²     GENERAL:  Laying in bed, awake, alert, cooperative, no apparent distress  HEAD: Normocephalic, atraumatic  EYES: No sclera icterus, pupils equal  LUNGS:  No increased work of breathing  CARDIOVASCULAR:  Regular rate  ABDOMEN:  Soft, mod TTP epigastric region, non-distended  EXTREMITIES: No edema or swelling  SKIN: Warm and dry, no rashes or lesions    Assessment/Plan:  31 y.o. female with gallstone pancreatitis    Pain improving  For MRCP today to eval for choledocho  Bowel regimen  Continue NPO, IVF  May need GI consult for ERCP (Dr. Poli Arshad does not perform ERCP) depending on MRCP results  Pain/nausea control as per primary  Strict I&O  Awaiting AM labs, continue to trend LFTs    Electronically signed by Mindy Peterson DO on 2/15/2021 at 5:19 AM   Electronically signed by Michelle Frias MD on 2/15/2021 at 5:43 AM    Attending Note:    Patient seen/examined 2/15/2021. Agree with above assessment and plan. MRCP shows no choledocholithiasis, mild pancreatitis, cholelithaisis/gallbladder wall thickening. Lipase trending down, can try clears today. Cholecystectomy timing TBD.

## 2021-02-15 NOTE — PROGRESS NOTES
Patient not tolerating clear fluids, increased nausea and pain. I encouraged patient to take it slow with fluids and wait until the nausea passes to try to sip at her water.

## 2021-02-16 LAB
ALBUMIN SERPL-MCNC: 3.2 G/DL (ref 3.5–5.2)
ALP BLD-CCNC: 93 U/L (ref 35–104)
ALT SERPL-CCNC: 159 U/L (ref 0–32)
ANION GAP SERPL CALCULATED.3IONS-SCNC: 5 MMOL/L (ref 7–16)
AST SERPL-CCNC: 54 U/L (ref 0–31)
BILIRUB SERPL-MCNC: 1.3 MG/DL (ref 0–1.2)
BILIRUBIN DIRECT: 0.8 MG/DL (ref 0–0.3)
BILIRUBIN, INDIRECT: 0.5 MG/DL (ref 0–1)
BUN BLDV-MCNC: <2 MG/DL (ref 6–20)
CALCIUM SERPL-MCNC: 7.8 MG/DL (ref 8.6–10.2)
CHLORIDE BLD-SCNC: 105 MMOL/L (ref 98–107)
CO2: 26 MMOL/L (ref 22–29)
CREAT SERPL-MCNC: 0.6 MG/DL (ref 0.5–1)
GFR AFRICAN AMERICAN: >60
GFR NON-AFRICAN AMERICAN: >60 ML/MIN/1.73
GLUCOSE BLD-MCNC: 151 MG/DL (ref 74–99)
HCT VFR BLD CALC: 29.8 % (ref 34–48)
HEMOGLOBIN: 9.8 G/DL (ref 11.5–15.5)
LIPASE: 63 U/L (ref 13–60)
MCH RBC QN AUTO: 30.7 PG (ref 26–35)
MCHC RBC AUTO-ENTMCNC: 32.9 % (ref 32–34.5)
MCV RBC AUTO: 93.4 FL (ref 80–99.9)
PDW BLD-RTO: 13.6 FL (ref 11.5–15)
PLATELET # BLD: 131 E9/L (ref 130–450)
PMV BLD AUTO: 10.3 FL (ref 7–12)
POTASSIUM SERPL-SCNC: 3.4 MMOL/L (ref 3.5–5)
RBC # BLD: 3.19 E12/L (ref 3.5–5.5)
SODIUM BLD-SCNC: 136 MMOL/L (ref 132–146)
TOTAL PROTEIN: 5.7 G/DL (ref 6.4–8.3)
WBC # BLD: 6.5 E9/L (ref 4.5–11.5)

## 2021-02-16 PROCEDURE — 6370000000 HC RX 637 (ALT 250 FOR IP): Performed by: INTERNAL MEDICINE

## 2021-02-16 PROCEDURE — 80076 HEPATIC FUNCTION PANEL: CPT

## 2021-02-16 PROCEDURE — 2580000003 HC RX 258: Performed by: INTERNAL MEDICINE

## 2021-02-16 PROCEDURE — 6360000002 HC RX W HCPCS: Performed by: INTERNAL MEDICINE

## 2021-02-16 PROCEDURE — 99232 SBSQ HOSP IP/OBS MODERATE 35: CPT | Performed by: INTERNAL MEDICINE

## 2021-02-16 PROCEDURE — 83690 ASSAY OF LIPASE: CPT

## 2021-02-16 PROCEDURE — 36415 COLL VENOUS BLD VENIPUNCTURE: CPT

## 2021-02-16 PROCEDURE — 6370000000 HC RX 637 (ALT 250 FOR IP): Performed by: STUDENT IN AN ORGANIZED HEALTH CARE EDUCATION/TRAINING PROGRAM

## 2021-02-16 PROCEDURE — 85027 COMPLETE CBC AUTOMATED: CPT

## 2021-02-16 PROCEDURE — 1200000000 HC SEMI PRIVATE

## 2021-02-16 PROCEDURE — 80048 BASIC METABOLIC PNL TOTAL CA: CPT

## 2021-02-16 RX ADMIN — HYDROMORPHONE HYDROCHLORIDE 1 MG: 1 INJECTION, SOLUTION INTRAMUSCULAR; INTRAVENOUS; SUBCUTANEOUS at 18:28

## 2021-02-16 RX ADMIN — HYDROMORPHONE HYDROCHLORIDE 1 MG: 1 INJECTION, SOLUTION INTRAMUSCULAR; INTRAVENOUS; SUBCUTANEOUS at 03:57

## 2021-02-16 RX ADMIN — ONDANSETRON 4 MG: 2 INJECTION INTRAMUSCULAR; INTRAVENOUS at 12:20

## 2021-02-16 RX ADMIN — HYDROMORPHONE HYDROCHLORIDE 1 MG: 1 INJECTION, SOLUTION INTRAMUSCULAR; INTRAVENOUS; SUBCUTANEOUS at 12:20

## 2021-02-16 RX ADMIN — DEXTROSE AND SODIUM CHLORIDE: 5; 900 INJECTION, SOLUTION INTRAVENOUS at 09:25

## 2021-02-16 RX ADMIN — HYDROMORPHONE HYDROCHLORIDE 1 MG: 1 INJECTION, SOLUTION INTRAMUSCULAR; INTRAVENOUS; SUBCUTANEOUS at 01:34

## 2021-02-16 RX ADMIN — ONDANSETRON 4 MG: 2 INJECTION INTRAMUSCULAR; INTRAVENOUS at 05:36

## 2021-02-16 RX ADMIN — ACETAMINOPHEN 650 MG: 325 TABLET ORAL at 12:20

## 2021-02-16 RX ADMIN — HYDROMORPHONE HYDROCHLORIDE 1 MG: 1 INJECTION, SOLUTION INTRAMUSCULAR; INTRAVENOUS; SUBCUTANEOUS at 06:11

## 2021-02-16 RX ADMIN — ACETAMINOPHEN 650 MG: 325 TABLET ORAL at 18:28

## 2021-02-16 RX ADMIN — HYDROMORPHONE HYDROCHLORIDE 1 MG: 1 INJECTION, SOLUTION INTRAMUSCULAR; INTRAVENOUS; SUBCUTANEOUS at 08:34

## 2021-02-16 RX ADMIN — DOCUSATE SODIUM AND SENNOSIDES 2 TABLET: 8.6; 5 TABLET, FILM COATED ORAL at 08:34

## 2021-02-16 RX ADMIN — ACETAMINOPHEN 650 MG: 325 TABLET ORAL at 05:36

## 2021-02-16 RX ADMIN — ONDANSETRON 4 MG: 2 INJECTION INTRAMUSCULAR; INTRAVENOUS at 18:29

## 2021-02-16 RX ADMIN — SODIUM CHLORIDE, PRESERVATIVE FREE 10 ML: 5 INJECTION INTRAVENOUS at 12:21

## 2021-02-16 ASSESSMENT — PAIN SCALES - GENERAL
PAINLEVEL_OUTOF10: 7
PAINLEVEL_OUTOF10: 9
PAINLEVEL_OUTOF10: 7
PAINLEVEL_OUTOF10: 9
PAINLEVEL_OUTOF10: 5
PAINLEVEL_OUTOF10: 3
PAINLEVEL_OUTOF10: 7

## 2021-02-16 ASSESSMENT — PAIN - FUNCTIONAL ASSESSMENT: PAIN_FUNCTIONAL_ASSESSMENT: ACTIVITIES ARE NOT PREVENTED

## 2021-02-16 ASSESSMENT — PAIN DESCRIPTION - LOCATION: LOCATION: ABDOMEN

## 2021-02-16 NOTE — PROGRESS NOTES
GENERAL SURGERY  DAILY PROGRESS NOTE  2/16/2021    Subjective:  Started Clears after MRI and c/o worsening nausea, pain and emesis. Had diarrhea last night. Objective:  /64   Pulse 81   Temp 98.7 °F (37.1 °C) (Oral)   Resp 16   Ht 5' 6\" (1.676 m)   Wt 125 lb (56.7 kg)   LMP 01/15/2021 (Exact Date)   SpO2 99%   BMI 20.18 kg/m²     GENERAL:  Laying in bed, awake, alert, cooperative, no apparent distress  HEAD: Normocephalic, atraumatic  EYES: No sclera icterus, pupils equal  LUNGS:  No increased work of breathing  CARDIOVASCULAR:  Regular rate  ABDOMEN:  Soft, mod TTP epigastric region, non-distended  EXTREMITIES: No edema or swelling  SKIN: Warm and dry, no rashes or lesions    Assessment/Plan:  31 y.o. female with gallstone pancreatitis    Pain worse with advancing to clears  MRCP showing pancreatitis without choledocholithiasis  Bowel regimen  Continue NPO, IVF  Pain/nausea control as per primary  Strict I&O  Awaiting AM labs, continue to trend LFTs  Will discuss OR timing with Dr. Graciela Awan    Electronically signed by Brett Fields DO on 2/16/2021 at 5:27 AM     Attending Note:    Patient seen/examined 2/16/2021. Agree with above assessment and plan. Pain slightly improved today but still with nausea. Bowel rest/IVF. Lipase continues to improve.

## 2021-02-16 NOTE — PROGRESS NOTES
[facility]   Progress Note    Admitting Date and Time: 2/13/2021 12:45 AM  Admit Dx: Pancreatitis, unspecified pancreatitis type [K85.90]    Subjective:    Patient was admitted with Pancreatitis, unspecified pancreatitis type [K85.90]. Patient denies fever, chills, cp, sob, vomiting.  Pt c/o pain and some nausea     polyethylene glycol  17 g Oral Daily    sennosides-docusate sodium  2 tablet Oral Daily    sodium chloride flush  10 mL Intravenous 2 times per day         sodium chloride flush, 10 mL, PRN      ondansetron, 4 mg, Q6H PRN      acetaminophen, 650 mg, Q6H PRN    Or      acetaminophen, 650 mg, Q6H PRN      HYDROmorphone, 1 mg, Q2H PRN      promethazine, 12.5 mg, Q6H PRN         Objective:    /63   Pulse 84   Temp 98.3 °F (36.8 °C) (Oral)   Resp 16   Ht 5' 6\" (1.676 m)   Wt 125 lb (56.7 kg)   LMP 01/15/2021 (Exact Date)   SpO2 99%   BMI 20.18 kg/m²   Skin: warm and dry, no rash or erythema  Pulmonary/Chest: clear to auscultation bilaterally- no wheezes, rales or rhonchi, normal air movement, no respiratory distress  Cardiovascular: rhythm reg at rate of 88  Abdomen: soft, non-tender, non-distended, normal bowel sounds, no masses or organomegaly  Extremities: no cyanosis, no clubbing and no edema      Recent Labs     02/13/21 0108 02/14/21  0251 02/15/21  0510    134 133   K 3.7 3.8 3.8    102 101   CO2 25 20* 22   BUN 8 6 4*   CREATININE 1.0 0.6 0.6   GLUCOSE 134* 75 60*   CALCIUM 8.9 8.1* 8.1*       Recent Labs     02/13/21 0108 02/14/21  0251 02/15/21  0510   WBC 4.7 8.1 6.9   RBC 3.98 3.52 3.15*   HGB 12.2 10.6* 9.8*   HCT 36.8 33.4* 30.0*   MCV 92.5 94.9 95.2   MCH 30.7 30.1 31.1   MCHC 33.2 31.7* 32.7   RDW 13.4 13.5 13.7    140 126*   MPV 10.2 10.1 10.3       CBC:   Lab Results   Component Value Date    WBC 6.9 02/15/2021    RBC 3.15 02/15/2021    HGB 9.8 02/15/2021    HCT 30.0 02/15/2021    MCV 95.2 02/15/2021    MCH 31.1 02/15/2021    MCHC 32.7 02/15/2021    RDW 13.7 02/15/2021     02/15/2021    MPV 10.3 02/15/2021     BMP:    Lab Results   Component Value Date     02/15/2021    K 3.8 02/15/2021    K 3.8 02/14/2021     02/15/2021    CO2 22 02/15/2021    BUN 4 02/15/2021    LABALBU 3.2 02/15/2021    CREATININE 0.6 02/15/2021    CALCIUM 8.1 02/15/2021    GFRAA >60 02/15/2021    LABGLOM >60 02/15/2021    GLUCOSE 60 02/15/2021     Hepatic Function Panel:    Lab Results   Component Value Date    ALKPHOS 100 02/15/2021     02/15/2021    AST 91 02/15/2021    PROT 5.9 02/15/2021    BILITOT 2.0 02/15/2021    BILIDIR 1.2 02/15/2021    IBILI 0.8 02/15/2021    LABALBU 3.2 02/15/2021     LIPASE:    Lab Results   Component Value Date    LIPASE 404 02/15/2021        Radiology:   MRI ABDOMEN W WO CONTRAST MRCP   Final Result   1. Cholelithiasis with gallbladder wall thickening, for which the   possibility of acute cholecystitis should be considered. 2.  Mild acute pancreatitis. 3.  No biliary dilatation or evidence for choledocholithiasis. 4.  Small pleural effusions. 5.  Subcentimeter hepatic cysts and right hepatic lobe hemangioma. Assessment:    Active Problems:    Pancreatitis, unspecified pancreatitis type    Calculus of gallbladder without cholecystitis without obstruction    Acidosis    Dehydration  Resolved Problems:    * No resolved hospital problems. *      Plan:  1. Pancreatitis likely due to gallstones  Npo surgery following monitor pt and labs  Symptom control with meds. Diet per surgery. 2. Cholelithiasis cholecystectomy with IOC per surgery once lipase improving  3. Elevated lfts monitor improving  4. Thrombocytopenia monitor  5. Hypoglycemia dextrose added to iv fluids  6. Acidosis monitor  7.  Dehydration continue iv fluids        Electronically signed by Keenan Johnson DO on 2/15/2021 at 11:52 PM

## 2021-02-17 LAB
ALBUMIN SERPL-MCNC: 3.3 G/DL (ref 3.5–5.2)
ALP BLD-CCNC: 86 U/L (ref 35–104)
ALT SERPL-CCNC: 141 U/L (ref 0–32)
ANION GAP SERPL CALCULATED.3IONS-SCNC: 7 MMOL/L (ref 7–16)
AST SERPL-CCNC: 52 U/L (ref 0–31)
BILIRUB SERPL-MCNC: 1 MG/DL (ref 0–1.2)
BILIRUBIN DIRECT: 0.5 MG/DL (ref 0–0.3)
BILIRUBIN, INDIRECT: 0.5 MG/DL (ref 0–1)
BUN BLDV-MCNC: <2 MG/DL (ref 6–20)
CALCIUM SERPL-MCNC: 8.1 MG/DL (ref 8.6–10.2)
CHLORIDE BLD-SCNC: 106 MMOL/L (ref 98–107)
CO2: 25 MMOL/L (ref 22–29)
CREAT SERPL-MCNC: 0.6 MG/DL (ref 0.5–1)
GFR AFRICAN AMERICAN: >60
GFR NON-AFRICAN AMERICAN: >60 ML/MIN/1.73
GLUCOSE BLD-MCNC: 118 MG/DL (ref 74–99)
HCT VFR BLD CALC: 30 % (ref 34–48)
HEMOGLOBIN: 9.5 G/DL (ref 11.5–15.5)
LIPASE: 52 U/L (ref 13–60)
MAGNESIUM: 1.7 MG/DL (ref 1.6–2.6)
MCH RBC QN AUTO: 30.1 PG (ref 26–35)
MCHC RBC AUTO-ENTMCNC: 31.7 % (ref 32–34.5)
MCV RBC AUTO: 94.9 FL (ref 80–99.9)
PDW BLD-RTO: 14.1 FL (ref 11.5–15)
PHOSPHORUS: 2.6 MG/DL (ref 2.5–4.5)
PLATELET # BLD: 125 E9/L (ref 130–450)
PMV BLD AUTO: 10 FL (ref 7–12)
POTASSIUM SERPL-SCNC: 3.2 MMOL/L (ref 3.5–5)
RBC # BLD: 3.16 E12/L (ref 3.5–5.5)
SODIUM BLD-SCNC: 138 MMOL/L (ref 132–146)
TOTAL PROTEIN: 5.8 G/DL (ref 6.4–8.3)
WBC # BLD: 3.5 E9/L (ref 4.5–11.5)

## 2021-02-17 PROCEDURE — 83690 ASSAY OF LIPASE: CPT

## 2021-02-17 PROCEDURE — 99232 SBSQ HOSP IP/OBS MODERATE 35: CPT | Performed by: INTERNAL MEDICINE

## 2021-02-17 PROCEDURE — 36415 COLL VENOUS BLD VENIPUNCTURE: CPT

## 2021-02-17 PROCEDURE — 6360000002 HC RX W HCPCS: Performed by: SURGERY

## 2021-02-17 PROCEDURE — 84100 ASSAY OF PHOSPHORUS: CPT

## 2021-02-17 PROCEDURE — 85027 COMPLETE CBC AUTOMATED: CPT

## 2021-02-17 PROCEDURE — 6360000002 HC RX W HCPCS: Performed by: INTERNAL MEDICINE

## 2021-02-17 PROCEDURE — 80048 BASIC METABOLIC PNL TOTAL CA: CPT

## 2021-02-17 PROCEDURE — 83735 ASSAY OF MAGNESIUM: CPT

## 2021-02-17 PROCEDURE — 2580000003 HC RX 258: Performed by: STUDENT IN AN ORGANIZED HEALTH CARE EDUCATION/TRAINING PROGRAM

## 2021-02-17 PROCEDURE — 80076 HEPATIC FUNCTION PANEL: CPT

## 2021-02-17 PROCEDURE — 6370000000 HC RX 637 (ALT 250 FOR IP): Performed by: INTERNAL MEDICINE

## 2021-02-17 PROCEDURE — 1200000000 HC SEMI PRIVATE

## 2021-02-17 PROCEDURE — 6370000000 HC RX 637 (ALT 250 FOR IP): Performed by: STUDENT IN AN ORGANIZED HEALTH CARE EDUCATION/TRAINING PROGRAM

## 2021-02-17 RX ORDER — OXYCODONE HYDROCHLORIDE 5 MG/1
5 TABLET ORAL EVERY 4 HOURS PRN
Status: DISCONTINUED | OUTPATIENT
Start: 2021-02-17 | End: 2021-02-18 | Stop reason: HOSPADM

## 2021-02-17 RX ORDER — POTASSIUM CHLORIDE 7.45 MG/ML
40 INJECTION INTRAVENOUS ONCE
Status: DISCONTINUED | OUTPATIENT
Start: 2021-02-17 | End: 2021-02-17

## 2021-02-17 RX ORDER — POTASSIUM CHLORIDE 7.45 MG/ML
10 INJECTION INTRAVENOUS
Status: COMPLETED | OUTPATIENT
Start: 2021-02-17 | End: 2021-02-17

## 2021-02-17 RX ORDER — MAGNESIUM SULFATE 1 G/100ML
1000 INJECTION INTRAVENOUS ONCE
Status: COMPLETED | OUTPATIENT
Start: 2021-02-17 | End: 2021-02-17

## 2021-02-17 RX ADMIN — POTASSIUM CHLORIDE 10 MEQ: 10 INJECTION, SOLUTION INTRAVENOUS at 11:54

## 2021-02-17 RX ADMIN — ONDANSETRON 4 MG: 2 INJECTION INTRAMUSCULAR; INTRAVENOUS at 01:32

## 2021-02-17 RX ADMIN — POTASSIUM CHLORIDE 10 MEQ: 10 INJECTION, SOLUTION INTRAVENOUS at 13:00

## 2021-02-17 RX ADMIN — ONDANSETRON 4 MG: 2 INJECTION INTRAMUSCULAR; INTRAVENOUS at 07:44

## 2021-02-17 RX ADMIN — POTASSIUM CHLORIDE 10 MEQ: 10 INJECTION, SOLUTION INTRAVENOUS at 10:00

## 2021-02-17 RX ADMIN — ACETAMINOPHEN 650 MG: 325 TABLET ORAL at 01:32

## 2021-02-17 RX ADMIN — ONDANSETRON 4 MG: 2 INJECTION INTRAMUSCULAR; INTRAVENOUS at 20:17

## 2021-02-17 RX ADMIN — ACETAMINOPHEN 650 MG: 325 TABLET ORAL at 14:27

## 2021-02-17 RX ADMIN — HYDROMORPHONE HYDROCHLORIDE 1 MG: 1 INJECTION, SOLUTION INTRAMUSCULAR; INTRAVENOUS; SUBCUTANEOUS at 04:09

## 2021-02-17 RX ADMIN — DOCUSATE SODIUM AND SENNOSIDES 2 TABLET: 8.6; 5 TABLET, FILM COATED ORAL at 08:28

## 2021-02-17 RX ADMIN — ACETAMINOPHEN 650 MG: 325 TABLET ORAL at 07:44

## 2021-02-17 RX ADMIN — POTASSIUM CHLORIDE 10 MEQ: 10 INJECTION, SOLUTION INTRAVENOUS at 08:28

## 2021-02-17 RX ADMIN — MAGNESIUM SULFATE HEPTAHYDRATE 1000 MG: 1 INJECTION, SOLUTION INTRAVENOUS at 08:21

## 2021-02-17 RX ADMIN — ACETAMINOPHEN 650 MG: 325 TABLET ORAL at 20:17

## 2021-02-17 RX ADMIN — DEXTROSE AND SODIUM CHLORIDE: 5; 900 INJECTION, SOLUTION INTRAVENOUS at 18:24

## 2021-02-17 RX ADMIN — HYDROMORPHONE HYDROCHLORIDE 0.5 MG: 1 INJECTION, SOLUTION INTRAMUSCULAR; INTRAVENOUS; SUBCUTANEOUS at 20:17

## 2021-02-17 ASSESSMENT — PAIN DESCRIPTION - FREQUENCY
FREQUENCY: CONTINUOUS
FREQUENCY: CONTINUOUS

## 2021-02-17 ASSESSMENT — PAIN SCALES - GENERAL
PAINLEVEL_OUTOF10: 8
PAINLEVEL_OUTOF10: 1
PAINLEVEL_OUTOF10: 3
PAINLEVEL_OUTOF10: 2

## 2021-02-17 ASSESSMENT — PAIN DESCRIPTION - DESCRIPTORS
DESCRIPTORS: ACHING
DESCRIPTORS: ACHING

## 2021-02-17 ASSESSMENT — PAIN DESCRIPTION - LOCATION: LOCATION: ABDOMEN

## 2021-02-17 ASSESSMENT — PAIN DESCRIPTION - ORIENTATION
ORIENTATION: MID
ORIENTATION: MID

## 2021-02-17 ASSESSMENT — PAIN DESCRIPTION - ONSET: ONSET: ON-GOING

## 2021-02-17 ASSESSMENT — PAIN DESCRIPTION - PAIN TYPE
TYPE: ACUTE PAIN

## 2021-02-17 ASSESSMENT — PAIN DESCRIPTION - PROGRESSION: CLINICAL_PROGRESSION: NOT CHANGED

## 2021-02-17 NOTE — PROGRESS NOTES
GENERAL SURGERY  DAILY PROGRESS NOTE  2/17/2021    Subjective:  Pain improved since yesterday. Still with nausea but no emesis     Objective:  /65   Pulse 68   Temp 98 °F (36.7 °C) (Oral)   Resp 14   Ht 5' 6\" (1.676 m)   Wt 125 lb (56.7 kg)   LMP 01/15/2021 (Exact Date)   SpO2 93%   BMI 20.18 kg/m²     GENERAL:  Laying in bed, awake, alert, cooperative, no apparent distress  HEAD: Normocephalic, atraumatic  EYES: No sclera icterus, pupils equal  LUNGS:  No increased work of breathing  CARDIOVASCULAR:  Regular rate  ABDOMEN:  Soft, mildly TTP epigastric region, non-distended  EXTREMITIES: No edema or swelling  SKIN: Warm and dry, no rashes or lesions    Assessment/Plan:  31 y.o. female with gallstone pancreatitis    Pain improving  MRCP showing pancreatitis without choledocholithiasis  Bowel regimen  Trial advance to clears  Pain/nausea control as per primary  Strict I&O  Awaiting AM labs, LFTs and lipase downtrending  Will discuss OR timing with Dr. Ivy Collado    Electronically signed by Terese Bowser DO on 2/17/2021 at 5:24 AM     Will plan for outpatient cholecystectomy if patient continues to improve. Electronically signed by Michael Hernandez MD on 2/17/2021 at 7:35 AM    Attending Note:    Patient seen/examined 2/17/2021. Agree with above assessment and plan. Patient's pain improved today. Tolerating clears. Recommend trying to advance diet to low fat and plan for outpatient cholecystectomy.

## 2021-02-17 NOTE — PROGRESS NOTES
[facility] CARE AT [facility]   Progress Note    Admitting Date and Time: 2/13/2021 12:45 AM  Admit Dx: Pancreatitis, unspecified pancreatitis type [K85.90]    Subjective:    Patient was admitted with Pancreatitis, unspecified pancreatitis type [K85.90]. Patient denies fever, chills, cp, sob. Pt c/o n/v yesterday but with some improvement today. Pt noted pain.       polyethylene glycol  17 g Oral Daily    sennosides-docusate sodium  2 tablet Oral Daily    sodium chloride flush  10 mL Intravenous 2 times per day         sodium chloride flush, 10 mL, PRN      ondansetron, 4 mg, Q6H PRN      acetaminophen, 650 mg, Q6H PRN    Or      acetaminophen, 650 mg, Q6H PRN      HYDROmorphone, 1 mg, Q2H PRN      promethazine, 12.5 mg, Q6H PRN         Objective:    /65   Pulse 68   Temp 98 °F (36.7 °C) (Oral)   Resp 14   Ht 5' 6\" (1.676 m)   Wt 125 lb (56.7 kg)   LMP 01/15/2021 (Exact Date)   SpO2 93%   BMI 20.18 kg/m²   Skin: warm and dry, no rash or erythema  Pulmonary/Chest: clear to auscultation bilaterally- no wheezes, rales or rhonchi, normal air movement, no respiratory distress  Cardiovascular: rhythm reg at rate of 60  Abdomen: soft, non-tender, non-distended, normal bowel sounds, no masses or organomegaly  Extremities: no cyanosis, no clubbing and no edema      Recent Labs     02/14/21  0251 02/15/21  0510 02/16/21  0348    133 136   K 3.8 3.8 3.4*    101 105   CO2 20* 22 26   BUN 6 4* <2*   CREATININE 0.6 0.6 0.6   GLUCOSE 75 60* 151*   CALCIUM 8.1* 8.1* 7.8*       Recent Labs     02/14/21  0251 02/15/21  0510 02/16/21  0348   WBC 8.1 6.9 6.5   RBC 3.52 3.15* 3.19*   HGB 10.6* 9.8* 9.8*   HCT 33.4* 30.0* 29.8*   MCV 94.9 95.2 93.4   MCH 30.1 31.1 30.7   MCHC 31.7* 32.7 32.9   RDW 13.5 13.7 13.6    126* 131   MPV 10.1 10.3 10.3       CBC:   Lab Results   Component Value Date    WBC 6.5 02/16/2021    RBC 3.19 02/16/2021    HGB 9.8 02/16/2021    HCT 29.8 02/16/2021    MCV 93.4 02/16/2021    MCH 30.7 02/16/2021    MCHC 32.9 02/16/2021    RDW 13.6 02/16/2021     02/16/2021    MPV 10.3 02/16/2021     BMP:    Lab Results   Component Value Date     02/16/2021    K 3.4 02/16/2021    K 3.8 02/14/2021     02/16/2021    CO2 26 02/16/2021    BUN <2 02/16/2021    LABALBU 3.2 02/16/2021    CREATININE 0.6 02/16/2021    CALCIUM 7.8 02/16/2021    GFRAA >60 02/16/2021    LABGLOM >60 02/16/2021    GLUCOSE 151 02/16/2021     Hepatic Function Panel:    Lab Results   Component Value Date    ALKPHOS 93 02/16/2021     02/16/2021    AST 54 02/16/2021    PROT 5.7 02/16/2021    BILITOT 1.3 02/16/2021    BILIDIR 0.8 02/16/2021    IBILI 0.5 02/16/2021    LABALBU 3.2 02/16/2021     LIPASE:    Lab Results   Component Value Date    LIPASE 63 02/16/2021        Radiology:   MRI ABDOMEN W WO CONTRAST MRCP   Final Result   1. Cholelithiasis with gallbladder wall thickening, for which the   possibility of acute cholecystitis should be considered. 2.  Mild acute pancreatitis. 3.  No biliary dilatation or evidence for choledocholithiasis. 4.  Small pleural effusions. 5.  Subcentimeter hepatic cysts and right hepatic lobe hemangioma. Assessment:    Active Problems:    Pancreatitis, unspecified pancreatitis type    Calculus of gallbladder without cholecystitis without obstruction    Acidosis    Dehydration    Thrombocytopenia (HCC)  Resolved Problems:    * No resolved hospital problems. *      Plan:  1. Pancreatitis likely due to gallstones  Npo surgery following monitor pt and labs  Symptom control with meds. Diet per surgery. Monitor lipase  2. Cholelithiasis  Timing of surgery at discretion of surgery  3. Elevated lfts monitor improving  4. Thrombocytopenia monitor improving  5. Hypoglycemia improved with fluids and now with hyperglycemia continue to monitor  6. Acidosis monitor improving  7.  Dehydration continue iv fluids

## 2021-02-18 VITALS
HEART RATE: 70 BPM | HEIGHT: 66 IN | RESPIRATION RATE: 16 BRPM | WEIGHT: 125 LBS | OXYGEN SATURATION: 95 % | BODY MASS INDEX: 20.09 KG/M2 | DIASTOLIC BLOOD PRESSURE: 74 MMHG | TEMPERATURE: 98 F | SYSTOLIC BLOOD PRESSURE: 119 MMHG

## 2021-02-18 LAB
ALBUMIN SERPL-MCNC: 3.6 G/DL (ref 3.5–5.2)
ALP BLD-CCNC: 98 U/L (ref 35–104)
ALT SERPL-CCNC: 131 U/L (ref 0–32)
ANION GAP SERPL CALCULATED.3IONS-SCNC: 8 MMOL/L (ref 7–16)
AST SERPL-CCNC: 47 U/L (ref 0–31)
BILIRUB SERPL-MCNC: 0.9 MG/DL (ref 0–1.2)
BILIRUBIN DIRECT: 0.4 MG/DL (ref 0–0.3)
BILIRUBIN, INDIRECT: 0.5 MG/DL (ref 0–1)
BUN BLDV-MCNC: <2 MG/DL (ref 6–20)
CALCIUM SERPL-MCNC: 8.8 MG/DL (ref 8.6–10.2)
CHLORIDE BLD-SCNC: 107 MMOL/L (ref 98–107)
CO2: 24 MMOL/L (ref 22–29)
CREAT SERPL-MCNC: 0.6 MG/DL (ref 0.5–1)
GFR AFRICAN AMERICAN: >60
GFR NON-AFRICAN AMERICAN: >60 ML/MIN/1.73
GLUCOSE BLD-MCNC: 97 MG/DL (ref 74–99)
HCT VFR BLD CALC: 33.3 % (ref 34–48)
HEMOGLOBIN: 10.5 G/DL (ref 11.5–15.5)
LIPASE: 67 U/L (ref 13–60)
MCH RBC QN AUTO: 29.9 PG (ref 26–35)
MCHC RBC AUTO-ENTMCNC: 31.5 % (ref 32–34.5)
MCV RBC AUTO: 94.9 FL (ref 80–99.9)
PDW BLD-RTO: 13.8 FL (ref 11.5–15)
PLATELET # BLD: 178 E9/L (ref 130–450)
PMV BLD AUTO: 10.3 FL (ref 7–12)
POTASSIUM SERPL-SCNC: 3.6 MMOL/L (ref 3.5–5)
RBC # BLD: 3.51 E12/L (ref 3.5–5.5)
SODIUM BLD-SCNC: 139 MMOL/L (ref 132–146)
TOTAL PROTEIN: 6.9 G/DL (ref 6.4–8.3)
WBC # BLD: 3.1 E9/L (ref 4.5–11.5)

## 2021-02-18 PROCEDURE — 99239 HOSP IP/OBS DSCHRG MGMT >30: CPT | Performed by: INTERNAL MEDICINE

## 2021-02-18 PROCEDURE — 85027 COMPLETE CBC AUTOMATED: CPT

## 2021-02-18 PROCEDURE — 80048 BASIC METABOLIC PNL TOTAL CA: CPT

## 2021-02-18 PROCEDURE — 83690 ASSAY OF LIPASE: CPT

## 2021-02-18 PROCEDURE — 80076 HEPATIC FUNCTION PANEL: CPT

## 2021-02-18 PROCEDURE — 36415 COLL VENOUS BLD VENIPUNCTURE: CPT

## 2021-02-18 RX ORDER — ONDANSETRON 4 MG/1
4 TABLET, ORALLY DISINTEGRATING ORAL EVERY 8 HOURS PRN
Qty: 30 TABLET | Refills: 0 | Status: SHIPPED | OUTPATIENT
Start: 2021-02-18 | End: 2022-03-15

## 2021-02-18 RX ORDER — HYDROCODONE BITARTRATE AND ACETAMINOPHEN 5; 325 MG/1; MG/1
1 TABLET ORAL EVERY 6 HOURS PRN
Qty: 10 TABLET | Refills: 0 | Status: SHIPPED | OUTPATIENT
Start: 2021-02-18 | End: 2021-02-21

## 2021-02-18 ASSESSMENT — PAIN SCALES - GENERAL: PAINLEVEL_OUTOF10: 0

## 2021-02-18 NOTE — PROGRESS NOTES
[facility]    Admitting Date and Time: 2/13/2021 12:45 AM  Admit Dx: Pancreatitis, unspecified pancreatitis type [K85.90]    Subjective:    Patient was admitted with Pancreatitis, unspecified pancreatitis type [K85.90]. Patient denies fever, chills, cp, sob. N/v with improvement. Pain controlled.       polyethylene glycol  17 g Oral Daily    sennosides-docusate sodium  2 tablet Oral Daily    sodium chloride flush  10 mL Intravenous 2 times per day         HYDROmorphone, 0.5 mg, Q4H PRN      oxyCODONE, 5 mg, Q4H PRN      sodium chloride flush, 10 mL, PRN      ondansetron, 4 mg, Q6H PRN      acetaminophen, 650 mg, Q6H PRN    Or      acetaminophen, 650 mg, Q6H PRN      promethazine, 12.5 mg, Q6H PRN         Objective:    /74   Pulse 71   Temp 97.6 °F (36.4 °C) (Oral)   Resp 14   Ht 5' 6\" (1.676 m)   Wt 125 lb (56.7 kg)   LMP 01/15/2021 (Exact Date)   SpO2 95%   BMI 20.18 kg/m²   Skin: warm and dry, no rash or erythema  Pulmonary/Chest: clear to auscultation bilaterally- no wheezes, rales or rhonchi, normal air movement, no respiratory distress  Cardiovascular: rhythm reg at rate of 72  Abdomen: soft, non-tender, non-distended, normal bowel sounds, no masses or organomegaly  Extremities: no cyanosis, no clubbing and no edema      Recent Labs     02/15/21  0510 02/16/21  0348 02/17/21  0420    136 138   K 3.8 3.4* 3.2*    105 106   CO2 22 26 25   BUN 4* <2* <2*   CREATININE 0.6 0.6 0.6   GLUCOSE 60* 151* 118*   CALCIUM 8.1* 7.8* 8.1*       Recent Labs     02/15/21  0510 02/16/21  0348 02/17/21  0420   WBC 6.9 6.5 3.5*   RBC 3.15* 3.19* 3.16*   HGB 9.8* 9.8* 9.5*   HCT 30.0* 29.8* 30.0*   MCV 95.2 93.4 94.9   MCH 31.1 30.7 30.1   MCHC 32.7 32.9 31.7*   RDW 13.7 13.6 14.1   * 131 125*   MPV 10.3 10.3 10.0       CBC:   Lab Results   Component Value Date    WBC 3.5 02/17/2021    RBC 3.16 02/17/2021    HGB 9.5 02/17/2021    HCT 30.0 02/17/2021    MCV 94.9 02/17/2021    MCH 30.1 02/17/2021    MCHC 31.7 02/17/2021    RDW 14.1 02/17/2021     02/17/2021    MPV 10.0 02/17/2021     BMP:    Lab Results   Component Value Date     02/17/2021    K 3.2 02/17/2021    K 3.8 02/14/2021     02/17/2021    CO2 25 02/17/2021    BUN <2 02/17/2021    LABALBU 3.3 02/17/2021    CREATININE 0.6 02/17/2021    CALCIUM 8.1 02/17/2021    GFRAA >60 02/17/2021    LABGLOM >60 02/17/2021    GLUCOSE 118 02/17/2021     Hepatic Function Panel:    Lab Results   Component Value Date    ALKPHOS 86 02/17/2021     02/17/2021    AST 52 02/17/2021    PROT 5.8 02/17/2021    BILITOT 1.0 02/17/2021    BILIDIR 0.5 02/17/2021    IBILI 0.5 02/17/2021    LABALBU 3.3 02/17/2021     Magnesium:    Lab Results   Component Value Date    MG 1.7 02/17/2021     Phosphorus:    Lab Results   Component Value Date    PHOS 2.6 02/17/2021     LIPASE:    Lab Results   Component Value Date    LIPASE 52 02/17/2021        Radiology:   MRI ABDOMEN W WO CONTRAST MRCP   Final Result   1. Cholelithiasis with gallbladder wall thickening, for which the   possibility of acute cholecystitis should be considered. 2.  Mild acute pancreatitis. 3.  No biliary dilatation or evidence for choledocholithiasis. 4.  Small pleural effusions. 5.  Subcentimeter hepatic cysts and right hepatic lobe hemangioma. Assessment:    Active Problems:    Pancreatitis, unspecified pancreatitis type    Calculus of gallbladder without cholecystitis without obstruction    Acidosis    Dehydration    Thrombocytopenia (HCC)    Elevated LFTs  Resolved Problems:    * No resolved hospital problems. *      Plan:  1. Pancreatitis likely due to gallstones  Advance diet per surgery  Continue to monitor pt and labs  Symptom control with meds. Monitor lipase  2. Cholelithiasis  Timing of surgery at discretion of surgery  3. Elevated lfts monitor improving  4. Thrombocytopenia monitor improving  5.  Hypoglycemia improved with fluids and now with hyperglycemia continue to monitor  6. Acidosis monitor improving  7. Dehydration continue iv fluids  8.  Hypokalemia monitor and replace prn        Electronically signed by Tasha Young DO on 2/17/2021 at 7:45 PM

## 2021-02-18 NOTE — PLAN OF CARE
Problem: Pain:  Goal: Pain level will decrease  Description: Pain level will decrease  Outcome: Ongoing  Goal: Control of acute pain  Description: Control of acute pain  Outcome: Ongoing     Problem: Nausea/Vomiting:  Goal: Absence of nausea/vomiting  Description: Absence of nausea/vomiting  Outcome: Ongoing  Goal: Able to drink  Description: Able to drink  Outcome: Ongoing  Goal: Able to eat  Description: Able to eat  Outcome: Ongoing  Goal: Ability to achieve adequate nutritional intake will improve  Description: Ability to achieve adequate nutritional intake will improve  Outcome: Ongoing

## 2021-02-18 NOTE — PROGRESS NOTES
GENERAL SURGERY  DAILY PROGRESS NOTE  2/18/2021    Subjective:  Pain improved since yesterday. Tolerating low fat diet. Passing flatus and having BMs. Denies nausea or vomting    Objective:  /74   Pulse 70   Temp 98.2 °F (36.8 °C) (Oral)   Resp 16   Ht 5' 6\" (1.676 m)   Wt 125 lb (56.7 kg)   LMP 01/15/2021 (Exact Date)   SpO2 95%   BMI 20.18 kg/m²     GENERAL:  Laying in bed, awake, alert, cooperative, no apparent distress  HEAD: Normocephalic, atraumatic  EYES: No sclera icterus, pupils equal  LUNGS:  No increased work of breathing  CARDIOVASCULAR:  Regular rate  ABDOMEN:  Soft, nontender, non-distended  EXTREMITIES: No edema or swelling  SKIN: Warm and dry, no rashes or lesions    Assessment/Plan:  31 y.o. female with gallstone pancreatitis    Pain continues to improve  MRCP showing pancreatitis without choledocholithiasis  Bowel regimen  Tolerating low fat diet  Pain/nausea control as per primary  Recommend outpatient lap margarita with possible IOC when fully recovered from pancreatitis.     Ok to DC from surgery POV and f/u with Dr. Terrazas Carry    Electronically signed by Apoorva Parker DO on 2/18/2021 at 8:52 AM

## 2021-02-18 NOTE — DISCHARGE SUMMARY
light, extraocular eye movements intact, conjunctivae normal  Neck: neck supple and non tender without mass   Pulmonary/Chest: clear to auscultation bilaterally- no wheezes, rales or rhonchi, normal air movement, no respiratory distress  Cardiovascular: normal rate, normal S1 and S2 and no carotid bruits  Abdomen: soft, non-tender, non-distended, normal bowel sounds, no masses or organomegaly  Extremities: no cyanosis, no clubbing and no edema  Neurologic: no cranial nerve deficit and speech normal    I/O last 3 completed shifts: In: 9434 [I.V.:3545; IV Piggyback:400]  Out: -   No intake/output data recorded. LABS:  Recent Labs     02/16/21 0348 02/17/21 0420 02/18/21 0410    138 139   K 3.4* 3.2* 3.6    106 107   CO2 26 25 24   BUN <2* <2* <2*   CREATININE 0.6 0.6 0.6   GLUCOSE 151* 118* 97   CALCIUM 7.8* 8.1* 8.8       Recent Labs     02/16/21 0348 02/17/21 0420 02/18/21  0410   WBC 6.5 3.5* 3.1*   RBC 3.19* 3.16* 3.51   HGB 9.8* 9.5* 10.5*   HCT 29.8* 30.0* 33.3*   MCV 93.4 94.9 94.9   MCH 30.7 30.1 29.9   MCHC 32.9 31.7* 31.5*   RDW 13.6 14.1 13.8    125* 178   MPV 10.3 10.0 10.3       No results for input(s): POCGLU in the last 72 hours. Imaging:  No results found. Patient Instructions:      Medication List      CONTINUE taking these medications    famotidine 20 MG tablet  Commonly known as: Pepcid  Take 1 tablet by mouth 2 times daily as needed (Acid reflux)     HYDROcodone-acetaminophen 5-325 MG per tablet  Commonly known as: Norco  Take 1 tablet by mouth every 6 hours as needed for Pain for up to 3 days. Intended supply: 3 days.  Take lowest dose possible to manage pain     ketorolac 10 MG tablet  Commonly known as: TORADOL     * ondansetron 4 MG disintegrating tablet  Commonly known as: Zofran ODT  Take 1 tablet by mouth every 8 hours as needed for Nausea or Vomiting     * ondansetron 4 MG disintegrating tablet  Commonly known as: Zofran ODT  Take 1 tablet by mouth every 8 hours as needed for Nausea or Vomiting         * This list has 2 medication(s) that are the same as other medications prescribed for you. Read the directions carefully, and ask your doctor or other care provider to review them with you.                Where to Get Your Medications      These medications were sent to [address] [place] [address] 1, [address] [facility] [address]    Phone: [phone]   · HYDROcodone-acetaminophen 5-325 MG per tablet  · ondansetron 4 MG disintegrating tablet           Note that more than 30 minutes was spent in preparing discharge papers, discussing discharge with patient, medication review, etc.    Signed:  Electronically signed by Marylene Brick, MD on 2/18/2021 at 11:07 AM

## 2021-02-25 ENCOUNTER — HOSPITAL ENCOUNTER (OUTPATIENT)
Age: 26
Discharge: HOME | End: 2021-02-27

## 2021-02-25 PROCEDURE — 88304 TISSUE EXAM BY PATHOLOGIST: CPT

## 2022-02-22 ENCOUNTER — APPOINTMENT (OUTPATIENT)
Dept: CT IMAGING | Age: 27
End: 2022-02-22
Payer: COMMERCIAL

## 2022-02-22 ENCOUNTER — APPOINTMENT (OUTPATIENT)
Dept: GENERAL RADIOLOGY | Age: 27
End: 2022-02-22
Payer: COMMERCIAL

## 2022-02-22 ENCOUNTER — HOSPITAL ENCOUNTER (EMERGENCY)
Age: 27
Discharge: HOME | End: 2022-02-22
Payer: COMMERCIAL

## 2022-02-22 VITALS
BODY MASS INDEX: 19.93 KG/M2 | DIASTOLIC BLOOD PRESSURE: 94 MMHG | SYSTOLIC BLOOD PRESSURE: 112 MMHG | TEMPERATURE: 97.9 F | WEIGHT: 124 LBS | OXYGEN SATURATION: 100 % | HEIGHT: 66 IN | HEART RATE: 90 BPM | RESPIRATION RATE: 18 BRPM

## 2022-02-22 DIAGNOSIS — G43.819 OTHER MIGRAINE WITHOUT STATUS MIGRAINOSUS, INTRACTABLE: ICD-10-CM

## 2022-02-22 DIAGNOSIS — R42 VERTIGO: Primary | ICD-10-CM

## 2022-02-22 LAB
ANION GAP SERPL CALCULATED.3IONS-SCNC: 11 MMOL/L (ref 7–16)
BACTERIA: ABNORMAL /HPF
BASOPHILS ABSOLUTE: 0.02 E9/L (ref 0–0.2)
BASOPHILS RELATIVE PERCENT: 0.5 % (ref 0–2)
BILIRUBIN URINE: NEGATIVE
BLOOD, URINE: NEGATIVE
BUN BLDV-MCNC: 8 MG/DL (ref 6–20)
CALCIUM SERPL-MCNC: 9.3 MG/DL (ref 8.6–10.2)
CHLORIDE BLD-SCNC: 101 MMOL/L (ref 98–107)
CLARITY: CLEAR
CO2: 25 MMOL/L (ref 22–29)
COLOR: YELLOW
CREAT SERPL-MCNC: 0.7 MG/DL (ref 0.5–1)
EOSINOPHILS ABSOLUTE: 0.03 E9/L (ref 0.05–0.5)
EOSINOPHILS RELATIVE PERCENT: 0.8 % (ref 0–6)
GFR AFRICAN AMERICAN: >60
GFR NON-AFRICAN AMERICAN: >60 ML/MIN/1.73
GLUCOSE BLD-MCNC: 105 MG/DL (ref 74–99)
GLUCOSE URINE: NEGATIVE MG/DL
HCG, URINE, POC: NEGATIVE
HCT VFR BLD CALC: 39.8 % (ref 34–48)
HEMOGLOBIN: 13.5 G/DL (ref 11.5–15.5)
IMMATURE GRANULOCYTES #: 0.01 E9/L
IMMATURE GRANULOCYTES %: 0.3 % (ref 0–5)
KETONES, URINE: NEGATIVE MG/DL
LEUKOCYTE ESTERASE, URINE: ABNORMAL
LYMPHOCYTES ABSOLUTE: 1.16 E9/L (ref 1.5–4)
LYMPHOCYTES RELATIVE PERCENT: 31.4 % (ref 20–42)
Lab: NORMAL
MCH RBC QN AUTO: 31.1 PG (ref 26–35)
MCHC RBC AUTO-ENTMCNC: 33.9 % (ref 32–34.5)
MCV RBC AUTO: 91.7 FL (ref 80–99.9)
MONOCYTES ABSOLUTE: 0.26 E9/L (ref 0.1–0.95)
MONOCYTES RELATIVE PERCENT: 7 % (ref 2–12)
NEGATIVE QC PASS/FAIL: NORMAL
NEUTROPHILS ABSOLUTE: 2.22 E9/L (ref 1.8–7.3)
NEUTROPHILS RELATIVE PERCENT: 60 % (ref 43–80)
NITRITE, URINE: NEGATIVE
PDW BLD-RTO: 12.6 FL (ref 11.5–15)
PH UA: 7 (ref 5–9)
PLATELET # BLD: 240 E9/L (ref 130–450)
PMV BLD AUTO: 10 FL (ref 7–12)
POSITIVE QC PASS/FAIL: NORMAL
POTASSIUM REFLEX MAGNESIUM: 3.8 MMOL/L (ref 3.5–5)
PROTEIN UA: NEGATIVE MG/DL
RBC # BLD: 4.34 E12/L (ref 3.5–5.5)
RBC UA: ABNORMAL /HPF (ref 0–2)
SARS-COV-2, NAAT: NOT DETECTED
SODIUM BLD-SCNC: 137 MMOL/L (ref 132–146)
SPECIFIC GRAVITY UA: <=1.005 (ref 1–1.03)
TROPONIN, HIGH SENSITIVITY: <6 NG/L (ref 0–9)
UROBILINOGEN, URINE: 0.2 E.U./DL
WBC # BLD: 3.7 E9/L (ref 4.5–11.5)
WBC UA: ABNORMAL /HPF (ref 0–5)

## 2022-02-22 PROCEDURE — 80048 BASIC METABOLIC PNL TOTAL CA: CPT

## 2022-02-22 PROCEDURE — 81001 URINALYSIS AUTO W/SCOPE: CPT

## 2022-02-22 PROCEDURE — 93005 ELECTROCARDIOGRAM TRACING: CPT | Performed by: NURSE PRACTITIONER

## 2022-02-22 PROCEDURE — 36415 COLL VENOUS BLD VENIPUNCTURE: CPT

## 2022-02-22 PROCEDURE — 84484 ASSAY OF TROPONIN QUANT: CPT

## 2022-02-22 PROCEDURE — 6370000000 HC RX 637 (ALT 250 FOR IP): Performed by: NURSE PRACTITIONER

## 2022-02-22 PROCEDURE — 71046 X-RAY EXAM CHEST 2 VIEWS: CPT

## 2022-02-22 PROCEDURE — 96374 THER/PROPH/DIAG INJ IV PUSH: CPT

## 2022-02-22 PROCEDURE — 96375 TX/PRO/DX INJ NEW DRUG ADDON: CPT

## 2022-02-22 PROCEDURE — 87635 SARS-COV-2 COVID-19 AMP PRB: CPT

## 2022-02-22 PROCEDURE — 2580000003 HC RX 258: Performed by: NURSE PRACTITIONER

## 2022-02-22 PROCEDURE — 99283 EMERGENCY DEPT VISIT LOW MDM: CPT

## 2022-02-22 PROCEDURE — 85025 COMPLETE CBC W/AUTO DIFF WBC: CPT

## 2022-02-22 PROCEDURE — 70450 CT HEAD/BRAIN W/O DYE: CPT

## 2022-02-22 PROCEDURE — 6360000002 HC RX W HCPCS: Performed by: NURSE PRACTITIONER

## 2022-02-22 RX ORDER — ACETAMINOPHEN 500 MG
1000 TABLET ORAL ONCE
Status: COMPLETED | OUTPATIENT
Start: 2022-02-22 | End: 2022-02-22

## 2022-02-22 RX ORDER — KETOROLAC TROMETHAMINE 30 MG/ML
30 INJECTION, SOLUTION INTRAMUSCULAR; INTRAVENOUS ONCE
Status: COMPLETED | OUTPATIENT
Start: 2022-02-22 | End: 2022-02-22

## 2022-02-22 RX ORDER — METOCLOPRAMIDE HYDROCHLORIDE 5 MG/ML
10 INJECTION INTRAMUSCULAR; INTRAVENOUS ONCE
Status: COMPLETED | OUTPATIENT
Start: 2022-02-22 | End: 2022-02-22

## 2022-02-22 RX ORDER — 0.9 % SODIUM CHLORIDE 0.9 %
1000 INTRAVENOUS SOLUTION INTRAVENOUS ONCE
Status: COMPLETED | OUTPATIENT
Start: 2022-02-22 | End: 2022-02-22

## 2022-02-22 RX ORDER — MECLIZINE HCL 12.5 MG/1
25 TABLET ORAL ONCE
Status: COMPLETED | OUTPATIENT
Start: 2022-02-22 | End: 2022-02-22

## 2022-02-22 RX ORDER — DIPHENHYDRAMINE HYDROCHLORIDE 50 MG/ML
25 INJECTION INTRAMUSCULAR; INTRAVENOUS ONCE
Status: COMPLETED | OUTPATIENT
Start: 2022-02-22 | End: 2022-02-22

## 2022-02-22 RX ORDER — MECLIZINE HCL 12.5 MG/1
12.5 TABLET ORAL 3 TIMES DAILY PRN
Qty: 12 TABLET | Refills: 0 | Status: SHIPPED | OUTPATIENT
Start: 2022-02-22 | End: 2022-02-25 | Stop reason: SDUPTHER

## 2022-02-22 RX ADMIN — DIPHENHYDRAMINE HYDROCHLORIDE 25 MG: 50 INJECTION INTRAMUSCULAR; INTRAVENOUS at 12:48

## 2022-02-22 RX ADMIN — KETOROLAC TROMETHAMINE 30 MG: 30 INJECTION, SOLUTION INTRAMUSCULAR; INTRAVENOUS at 14:21

## 2022-02-22 RX ADMIN — ACETAMINOPHEN 1000 MG: 500 TABLET ORAL at 12:48

## 2022-02-22 RX ADMIN — METOCLOPRAMIDE HYDROCHLORIDE 10 MG: 5 INJECTION INTRAMUSCULAR; INTRAVENOUS at 12:48

## 2022-02-22 RX ADMIN — SODIUM CHLORIDE 1000 ML: 9 INJECTION, SOLUTION INTRAVENOUS at 12:42

## 2022-02-22 RX ADMIN — MECLIZINE 25 MG: 12.5 TABLET ORAL at 14:21

## 2022-02-22 ASSESSMENT — PAIN - FUNCTIONAL ASSESSMENT
PAIN_FUNCTIONAL_ASSESSMENT: 0-10
PAIN_FUNCTIONAL_ASSESSMENT: PREVENTS OR INTERFERES SOME ACTIVE ACTIVITIES AND ADLS

## 2022-02-22 ASSESSMENT — PAIN DESCRIPTION - PAIN TYPE: TYPE: CHRONIC PAIN;ACUTE PAIN

## 2022-02-22 ASSESSMENT — PAIN DESCRIPTION - DESCRIPTORS: DESCRIPTORS: PRESSURE

## 2022-02-22 ASSESSMENT — PAIN SCALES - GENERAL
PAINLEVEL_OUTOF10: 6
PAINLEVEL_OUTOF10: 8
PAINLEVEL_OUTOF10: 8

## 2022-02-22 ASSESSMENT — PAIN DESCRIPTION - PROGRESSION: CLINICAL_PROGRESSION: GRADUALLY WORSENING

## 2022-02-22 ASSESSMENT — PAIN DESCRIPTION - ONSET: ONSET: ON-GOING

## 2022-02-22 ASSESSMENT — PAIN DESCRIPTION - LOCATION: LOCATION: HEAD

## 2022-02-22 ASSESSMENT — PAIN DESCRIPTION - FREQUENCY: FREQUENCY: INTERMITTENT

## 2022-02-22 NOTE — ED PROVIDER NOTES
[facility]  Department of Emergency Medicine   ED  Encounter Note  Admit Date/RoomTime: 2022 11:44 AM  ED Room: 32    NAME: Raymond Montanez  : 1995  MRN: [id number]     Chief Complaint:  Migraine (\"my whole life\" with worsening x3 months), Nausea (N/V and dizziness x2 weeks), Dizziness, and Emesis    History of Present Illness        Raymond Montanez is a 31 y.o. old female who presents to the emergency department by private vehicle, for gradual onset dizziness, vertigo and Lightheaded which began 3 month(s) prior to arrival.  Since recognized her symptoms have been recurrent and worsening. She has associated symptoms of nausea and denies any confusion, diaphoresis, fever, nasal congestion, chest pain, palpitations, blurred vision, diplopia, loss of vision, slurred speech, focal weakness, focal sensory loss, clumsiness, neck pain, incontinence or seizure activity. The symptoms are aggravated by rolling in bed to the left and rolling in bed to the right. She has a history of recurrent headaches. She takes no blood thinning agents. PERC Rule for PE for Age <50:      Age ? 50 negative     HR ? 100 negative     O2 Sat on Room Air < 95% negative     Prior History of DVT/PE negative     Recent Trauma or Surgery negative     Hemoptysis negative     Exogenous Estrogen/Hormone Use negative     Unilateral Extgremity Swelling negative     * If ANY Criteria are positive, the PERC rule is not satisfied and cannot be used to rule out PE in this patient. ROS   Pertinent positives and negatives are stated within HPI, all other systems reviewed and are negative. Past Medical History:  has no past medical history on file. Surgical History:  has no past surgical history on file. Social History:  reports that she has never smoked. She has never used smokeless tobacco. She reports previous alcohol use. She reports previous drug use.     Family History: family history is not on file. Allergies: Pcn [penicillins]    Physical Exam   Oxygen Saturation Interpretation: Normal.        ED Triage Vitals [02/22/22 1150]   BP Temp Temp Source Pulse Resp SpO2 Height Weight   (!) 112/94 97.9 °F (36.6 °C) Oral 90 18 100 % 5' 6\" (1.676 m) 124 lb (56.2 kg)         Constitutional:  Level of Consciousness: Awake and alert and Responds to voice. ETOH: No.         Distress: none,  cooperative. Eyes:  PERRL, EOMI, no discharge or conjunctival injection. Ears:  External ears without lesions. Throat:  Pharynx without injection, exudate, or tonsillar hypertrophy. Airway patient. Neck:  Normal ROM. Supple. Respiratory:  Clear to auscultation and breath sounds equal.  CV:  Regular rate and rhythm, normal heart sounds, without pathological murmurs, ectopy, gallops, or rubs. GI:  Abdomen Soft, nontender, good bowel sounds. No firm or pulsatile mass. Back:  No costovertebral tenderness. Integument:  Normal turgor. Warm, dry, without visible rash, unless noted elsewhere. Lymphatic: no lymphadenopathy noted  Neurological:  Oriented. Motor functions intact.     Lab / Imaging Results   (All laboratory and radiology results have been personally reviewed by myself)  Labs:  Results for orders placed or performed during the hospital encounter of 02/22/22   COVID-19, Rapid    Specimen: Nasopharyngeal Swab   Result Value Ref Range    SARS-CoV-2, NAAT Not Detected Not Detected   CBC with Auto Differential   Result Value Ref Range    WBC 3.7 (L) 4.5 - 11.5 E9/L    RBC 4.34 3.50 - 5.50 E12/L    Hemoglobin 13.5 11.5 - 15.5 g/dL    Hematocrit 39.8 34.0 - 48.0 %    MCV 91.7 80.0 - 99.9 fL    MCH 31.1 26.0 - 35.0 pg    MCHC 33.9 32.0 - 34.5 %    RDW 12.6 11.5 - 15.0 fL    Platelets 643 886 - 578 E9/L    MPV 10.0 7.0 - 12.0 fL    Neutrophils % 60.0 43.0 - 80.0 %    Immature Granulocytes % 0.3 0.0 - 5.0 %    Lymphocytes % 31.4 20.0 - 42.0 %    Monocytes % 7.0 2.0 - 12.0 %    Eosinophils % 0.8 0.0 - 6.0 %    Basophils % 0.5 0.0 - 2.0 %    Neutrophils Absolute 2.22 1.80 - 7.30 E9/L    Immature Granulocytes # 0.01 E9/L    Lymphocytes Absolute 1.16 (L) 1.50 - 4.00 E9/L    Monocytes Absolute 0.26 0.10 - 0.95 E9/L    Eosinophils Absolute 0.03 (L) 0.05 - 0.50 E9/L    Basophils Absolute 0.02 0.00 - 0.20 V2/U   Basic Metabolic Panel w/ Reflex to MG   Result Value Ref Range    Sodium 137 132 - 146 mmol/L    Potassium reflex Magnesium 3.8 3.5 - 5.0 mmol/L    Chloride 101 98 - 107 mmol/L    CO2 25 22 - 29 mmol/L    Anion Gap 11 7 - 16 mmol/L    Glucose 105 (H) 74 - 99 mg/dL    BUN 8 6 - 20 mg/dL    CREATININE 0.7 0.5 - 1.0 mg/dL    GFR Non-African American >60 >=60 mL/min/1.73    GFR African American >60     Calcium 9.3 8.6 - 10.2 mg/dL   Troponin   Result Value Ref Range    Troponin, High Sensitivity <6 0 - 9 ng/L   Urinalysis with Microscopic   Result Value Ref Range    Color, UA Yellow Straw/Yellow    Clarity, UA Clear Clear    Glucose, Ur Negative Negative mg/dL    Bilirubin Urine Negative Negative    Ketones, Urine Negative Negative mg/dL    Specific Gravity, UA <=1.005 1.005 - 1.030    Blood, Urine Negative Negative    pH, UA 7.0 5.0 - 9.0    Protein, UA Negative Negative mg/dL    Urobilinogen, Urine 0.2 <2.0 E.U./dL    Nitrite, Urine Negative Negative    Leukocyte Esterase, Urine TRACE (A) Negative    WBC, UA 1-3 0 - 5 /HPF    RBC, UA 0-1 0 - 2 /HPF    Bacteria, UA NONE SEEN None Seen /HPF   POC Pregnancy Urine   Result Value Ref Range    HCG, Urine, POC Negative Negative    Lot Number [id number]     Positive QC Pass/Fail Pass     Negative QC Pass/Fail Pass    EKG 12 Lead   Result Value Ref Range    Ventricular Rate 72 BPM    Atrial Rate 72 BPM    P-R Interval 132 ms    QRS Duration 92 ms    Q-T Interval 432 ms    QTc Calculation (Bazett) 473 ms    P Axis 74 degrees    R Axis 95 degrees    T Axis 37 degrees     Imaging: All Radiology results interpreted by Radiologist unless otherwise noted.   CT Head WO Contrast   Final Result   No acute intracranial abnormality. Specifically, there is no intracranial   hemorrhage or mass. If the patient's symptoms persist dedicated more   sensitive MRI is recommended. XR CHEST (2 VW)   Final Result   No acute process. EKG: This EKG is signed and interpreted by ER attending. Rate: 72  Rhythm: Sinus  Interpretation: Normal sinus rhythm. No STEMI  Comparison: no previous EKG available    ED Course / Medical Decision Making     Medications   0.9 % sodium chloride bolus (0 mLs IntraVENous Stopped 2/22/22 1452)   acetaminophen (TYLENOL) tablet 1,000 mg (1,000 mg Oral Given 2/22/22 1248)   diphenhydrAMINE (BENADRYL) injection 25 mg (25 mg IntraVENous Given 2/22/22 1248)   metoclopramide (REGLAN) injection 10 mg (10 mg IntraVENous Given 2/22/22 1248)   ketorolac (TORADOL) injection 30 mg (30 mg IntraVENous Given 2/22/22 1421)   meclizine (ANTIVERT) tablet 25 mg (25 mg Oral Given 2/22/22 1421)        Re-Evaluations:  2/22/22      Time: 1447-reevaluated patient. She states she is no longer dizzy or having a headache. There remains no focal deficits. She feels comfortable being discharged home. She was discharged home with Antivert. Discussed appropriate use but no side effects of starting this medication. She states verbal understanding. Told her not to drive at discharge receiving sedating medications in the ER. She states she has a ride for home. Patients condition is improving after treatment. Consultations:             None    Procedures:   none    MDM:  Patient presents to the ED for headache that is causing dizziness and emesis for the last 2 weeks but migraines been on intermittent for 3-month. Differential diagnoses included but not limited to vertigo versus migraine. Workup in the ED revealed EKG was normal sinus rhythm. COVID-19 was negative. CBC was unremarkable. BMP was unremarkable.   Troponin was less than 6.urinalysis was unremarkable. Urine pregnancy was negative. Chest x-ray was unremarkable. CT of the head without contrast reveals no acute intracranial abnormalities. Specifically no intracranial hemorrhage or mass. Patient had no neurovascular combines or logical deficits. There is no focal deficit. TMs were normal bilateral.  Patient was given Benadryl, Tylenol, Toradol, Reglan, IV fluids and Antivert for their symptoms with good improvement. Patient continues to be non-toxic on re-evaluation. Findings were discussed with the patient and reasons to immediately return to the ED were articulated to them. They will follow-up with their PMD and neurology. Plan of Care/Counseling:  JOSSY García CNP reviewed today's visit with the patient in addition to providing specific details for the plan of care and counseling regarding the diagnosis and prognosis. Questions are answered at this time and are agreeable with the plan. Assessment      1. Vertigo    2. Other migraine without status migrainosus, intractable      This patient's ED course included: a personal history and physicial examination, re-evaluation prior to disposition, multiple bedside re-evaluations and IV medications  This patient has remained hemodynamically stable during their ED course. Plan   Discharged home. Patient condition is stable. New Medications     Discharge Medication List as of 2/22/2022  2:47 PM      START taking these medications    Details   meclizine (ANTIVERT) 12.5 MG tablet Take 1 tablet by mouth 3 times daily as needed for Dizziness, Disp-12 tablet, R-0Print           Electronically signed by JOSSY aGrcía CNP   DD: 2/22/22  **This report was transcribed using voice recognition software. Every effort was made to ensure accuracy; however, inadvertent computerized transcription errors may be present.   END OF PROVIDER NOTE        JOSSY García CNP  02/22/22 1952

## 2022-02-22 NOTE — Clinical Note
Estiven Chavez was seen and treated in our emergency department on 2/22/2022. She may return to work on 02/24/2022. If you have any questions or concerns, please don't hesitate to call.       Kaleb Jennings, APRN - CNP

## 2022-02-23 LAB
EKG ATRIAL RATE: 72 BPM
EKG P AXIS: 74 DEGREES
EKG P-R INTERVAL: 132 MS
EKG Q-T INTERVAL: 432 MS
EKG QRS DURATION: 92 MS
EKG QTC CALCULATION (BAZETT): 473 MS
EKG R AXIS: 95 DEGREES
EKG T AXIS: 37 DEGREES
EKG VENTRICULAR RATE: 72 BPM

## 2022-02-23 PROCEDURE — 93010 ELECTROCARDIOGRAM REPORT: CPT | Performed by: INTERNAL MEDICINE

## 2022-02-25 ENCOUNTER — OFFICE VISIT (OUTPATIENT)
Dept: PRIMARY CARE CLINIC | Age: 27
End: 2022-02-25
Payer: COMMERCIAL

## 2022-02-25 VITALS
SYSTOLIC BLOOD PRESSURE: 90 MMHG | HEART RATE: 100 BPM | RESPIRATION RATE: 14 BRPM | DIASTOLIC BLOOD PRESSURE: 62 MMHG | WEIGHT: 123 LBS | TEMPERATURE: 98.3 F | BODY MASS INDEX: 19.77 KG/M2 | HEIGHT: 66 IN | OXYGEN SATURATION: 98 %

## 2022-02-25 DIAGNOSIS — G43.909 MIGRAINE WITHOUT STATUS MIGRAINOSUS, NOT INTRACTABLE, UNSPECIFIED MIGRAINE TYPE: ICD-10-CM

## 2022-02-25 DIAGNOSIS — M54.2 NECK PAIN: ICD-10-CM

## 2022-02-25 DIAGNOSIS — R53.83 FATIGUE, UNSPECIFIED TYPE: ICD-10-CM

## 2022-02-25 DIAGNOSIS — N83.202 LEFT OVARIAN CYST: ICD-10-CM

## 2022-02-25 DIAGNOSIS — R42 VERTIGO: Primary | ICD-10-CM

## 2022-02-25 DIAGNOSIS — Z09 HOSPITAL DISCHARGE FOLLOW-UP: ICD-10-CM

## 2022-02-25 LAB
SEDIMENTATION RATE, ERYTHROCYTE: 4 MM/HR (ref 0–20)
T4 FREE: 1.19 NG/DL (ref 0.93–1.7)
TSH SERPL DL<=0.05 MIU/L-ACNC: 1.21 UIU/ML (ref 0.27–4.2)
VITAMIN D 25-HYDROXY: 13 NG/ML (ref 30–100)

## 2022-02-25 PROCEDURE — 99204 OFFICE O/P NEW MOD 45 MIN: CPT | Performed by: PHYSICIAN ASSISTANT

## 2022-02-25 RX ORDER — ESCITALOPRAM OXALATE 20 MG/1
20 TABLET ORAL DAILY
COMMUNITY

## 2022-02-25 RX ORDER — CYCLOBENZAPRINE HCL 5 MG
5 TABLET ORAL NIGHTLY PRN
Qty: 30 TABLET | Refills: 0 | Status: SHIPPED | OUTPATIENT
Start: 2022-02-25 | End: 2022-03-07

## 2022-02-25 RX ORDER — MECLIZINE HCL 12.5 MG/1
12.5 TABLET ORAL 3 TIMES DAILY PRN
Qty: 30 TABLET | Refills: 0 | Status: SHIPPED | OUTPATIENT
Start: 2022-02-25 | End: 2022-03-01

## 2022-02-25 SDOH — ECONOMIC STABILITY: FOOD INSECURITY: WITHIN THE PAST 12 MONTHS, YOU WORRIED THAT YOUR FOOD WOULD RUN OUT BEFORE YOU GOT MONEY TO BUY MORE.: NEVER TRUE

## 2022-02-25 SDOH — ECONOMIC STABILITY: FOOD INSECURITY: WITHIN THE PAST 12 MONTHS, THE FOOD YOU BOUGHT JUST DIDN'T LAST AND YOU DIDN'T HAVE MONEY TO GET MORE.: NEVER TRUE

## 2022-02-25 ASSESSMENT — PATIENT HEALTH QUESTIONNAIRE - PHQ9
SUM OF ALL RESPONSES TO PHQ QUESTIONS 1-9: 0
SUM OF ALL RESPONSES TO PHQ QUESTIONS 1-9: 0
2. FEELING DOWN, DEPRESSED OR HOPELESS: 0
SUM OF ALL RESPONSES TO PHQ QUESTIONS 1-9: 0
1. LITTLE INTEREST OR PLEASURE IN DOING THINGS: 0
SUM OF ALL RESPONSES TO PHQ QUESTIONS 1-9: 0
SUM OF ALL RESPONSES TO PHQ9 QUESTIONS 1 & 2: 0

## 2022-02-25 ASSESSMENT — SOCIAL DETERMINANTS OF HEALTH (SDOH): HOW HARD IS IT FOR YOU TO PAY FOR THE VERY BASICS LIKE FOOD, HOUSING, MEDICAL CARE, AND HEATING?: NOT VERY HARD

## 2022-02-25 NOTE — PROGRESS NOTES
HPI:  The patient is a 31 y.o. female who presents today to establish care. She was recently seen in the er for ha and vertigo. This has been ongoing for several months. She also has had weight gain and loss. She alternates diarrhea and constipation. She is tired all the time. She has neck stiffness that she believes causes her ha. She has a ha almost daily. She is not sleeping well. She denies cp, palps or sob. She has a hx of a suspicious lesion on the left ovary. This was found 1 yr ago, in the setting of gallstone pancreatitis. She said \"that was the least of my problems at that time. \" it was never followed up. History reviewed. No pertinent past medical history. History reviewed. No pertinent surgical history. History reviewed. No pertinent family history. Social History     Socioeconomic History    Marital status: Single     Spouse name: Not on file    Number of children: Not on file    Years of education: Not on file    Highest education level: Not on file   Occupational History    Not on file   Tobacco Use    Smoking status: Never Smoker    Smokeless tobacco: Never Used   Substance and Sexual Activity    Alcohol use: Not Currently    Drug use: Not Currently    Sexual activity: Not on file   Other Topics Concern    Not on file   Social History Narrative    Not on file     Social Determinants of Health     Financial Resource Strain: Low Risk     Difficulty of Paying Living Expenses: Not very hard   Food Insecurity: No Food Insecurity    Worried About Running Out of Food in the Last Year: Never true    Jesse of Food in the Last Year: Never true   Transportation Needs:     Lack of Transportation (Medical): Not on file    Lack of Transportation (Non-Medical):  Not on file   Physical Activity:     Days of Exercise per Week: Not on file    Minutes of Exercise per Session: Not on file   Stress:     Feeling of Stress : Not on file   Social Connections:     Frequency of Communication with Friends and Family: Not on file    Frequency of Social Gatherings with Friends and Family: Not on file    Attends Sikh Services: Not on file    Active Member of Clubs or Organizations: Not on file    Attends Club or Organization Meetings: Not on file    Marital Status: Not on file   Intimate Partner Violence:     Fear of Current or Ex-Partner: Not on file    Emotionally Abused: Not on file    Physically Abused: Not on file    Sexually Abused: Not on file   Housing Stability:     Unable to Pay for Housing in the Last Year: Not on file    Number of [place] in the Last Year: Not on file    Unstable Housing in the Last Year: Not on file      Allergies   Allergen Reactions    Pcn [Penicillins]         Review of Systems:  Constitutional:  No fever, + fatigue, no chills, + headaches, + weight change  Dermatology:  No rash, no mole, no dry or sensitive skin  ENT:  No cough, no sore throat, no sinus pain, no runny nose, no ear pain  Cardiology:  No chest pain, no palpitations, no leg edema, no shortness of breath, no PND  Endocrinology:  No polydipsia, no polyuria, no cold intolerance, no heat intolerance, no polyphagia, no hair changes  Gastroenterology:  No dysphagia, no abdominal pain, no nausea, no vomiting, no constipation, no diarrhea, no heartburn  Female Reproductive:  No hot flashes, no abnormal vaginal discharge, no pain with menstruation, no pelvic pain  Musculoskeletal:  No joint pain, no leg cramps, no back pain, no muscle aches  Respiratory:  No shortness of breath, no orthopnea, no wheezing, no HOUSER, no hemoptysis  Urology:  No blood in the urine, no urinary frequency, no urinary incontinence, no urinary urgency, no nocturia, no dysuria  Neurology:  No numbness/tingling, + dizziness, no weakness  Psychology:  No depression, no sleep disturbances, no suicidal ideation, + anxiety    Vitals:    02/25/22 1028   BP: 90/62   Pulse: 100   Resp: 14   Temp: 98.3 °F (36.8 °C)   SpO2: 98% Weight: 123 lb (55.8 kg)   Height: 5' 6\" (1.676 m)       Physical Exam  Constitutional:       General: She is not in acute distress. Appearance: She is well-developed. HENT:      Head: Normocephalic and atraumatic. Right Ear: External ear normal.      Left Ear: External ear normal.      Nose: Nose normal.   Eyes:      General: No scleral icterus. Conjunctiva/sclera: Conjunctivae normal.      Pupils: Pupils are equal, round, and reactive to light. Neck:      Thyroid: No thyromegaly. Cardiovascular:      Rate and Rhythm: Normal rate and regular rhythm. Heart sounds: Normal heart sounds. No murmur heard. Pulmonary:      Effort: Pulmonary effort is normal. No accessory muscle usage or respiratory distress. Breath sounds: Normal breath sounds. No wheezing. Musculoskeletal:         General: Normal range of motion. Cervical back: Normal range of motion and neck supple. Right lower leg: No edema. Left lower leg: No edema. Skin:     General: Skin is warm and dry. Findings: No rash. Neurological:      Mental Status: She is alert and oriented to person, place, and time. Motor: No weakness. Coordination: Coordination normal.      Deep Tendon Reflexes: Reflexes are normal and symmetric. Psychiatric:         Speech: Speech normal.         Behavior: Behavior normal.         Assessment/Plan:      Barbara Alvarez was seen today for new patient, other and discuss labs. Diagnoses and all orders for this visit:    Vertigo  -     meclizine (ANTIVERT) 12.5 MG tablet; Take 1 tablet by mouth 3 times daily as needed for Dizziness    Migraine without status migrainosus, not intractable, unspecified migraine type    Hospital discharge follow-up    Neck pain  -     cyclobenzaprine (FLEXERIL) 5 MG tablet; Take 1 tablet by mouth nightly as needed for Muscle spasms    Left ovarian cyst  -     US NON OB TRANSVAGINAL;  Future  -     US PELVIS COMPLETE; Future    Fatigue, unspecified type  -     TSH; Future  -     T4, Free; Future  -     Sedimentation Rate; Future  -     Vitamin D 25 Hydroxy; Future      As above. Call or go to ED immediately if symptoms worsen or persist.  Return in about 2 weeks (around 3/11/2022). , or sooner if necessary. Educational materials and/or home exercises printed for patient's review and were included in patient instructions on his/her After Visit Summary and given to patient at the end of visit. Counseled regarding above diagnosis, including possible risks and complications,  especially if left uncontrolled. Counseled regarding the possible side effects, risks, benefits and alternatives to treatment; patient and/or guardian verbalizes understanding, agrees, feels comfortable with and wishes to proceed with above treatment plan. Advised patient to call with any new medication issues, and read all Rx info from pharmacy to assure aware of all possible risks and side effects of medication before taking. Reviewed age and gender appropriate health screening exams and vaccinations. Advised patient regarding importance of keeping up with recommended health maintenance and to schedule as soon as possible if overdue, as this is important in assessing for undiagnosed pathology, especially cancer, as well as protecting against potentially harmful/life threatening disease. Patient and/or guardian verbalizes understanding and agrees with above counseling, assessment and plan. All questions answered. Fidel Madden PA-C  2/25/2022    I have personally reviewed and updated the chief complaint, HPI, Past Medical, Family and Social History, as well as the above Review of Systems.

## 2022-03-01 ENCOUNTER — HOSPITAL ENCOUNTER (OUTPATIENT)
Dept: ULTRASOUND IMAGING | Age: 27
Discharge: HOME | End: 2022-03-03
Payer: COMMERCIAL

## 2022-03-01 DIAGNOSIS — N83.202 LEFT OVARIAN CYST: ICD-10-CM

## 2022-03-01 PROCEDURE — 76856 US EXAM PELVIC COMPLETE: CPT

## 2022-03-01 RX ORDER — MELATONIN
1000 DAILY
Qty: 30 TABLET | Refills: 5 | Status: SHIPPED | OUTPATIENT
Start: 2022-03-01

## 2022-03-07 ENCOUNTER — OFFICE VISIT (OUTPATIENT)
Dept: PRIMARY CARE CLINIC | Age: 27
End: 2022-03-07
Payer: COMMERCIAL

## 2022-03-07 VITALS
HEART RATE: 95 BPM | SYSTOLIC BLOOD PRESSURE: 100 MMHG | RESPIRATION RATE: 18 BRPM | BODY MASS INDEX: 20.09 KG/M2 | DIASTOLIC BLOOD PRESSURE: 70 MMHG | HEIGHT: 66 IN | OXYGEN SATURATION: 97 % | TEMPERATURE: 98.2 F | WEIGHT: 125 LBS

## 2022-03-07 DIAGNOSIS — K21.9 GASTROESOPHAGEAL REFLUX DISEASE WITHOUT ESOPHAGITIS: ICD-10-CM

## 2022-03-07 DIAGNOSIS — K58.2 IRRITABLE BOWEL SYNDROME WITH BOTH CONSTIPATION AND DIARRHEA: Primary | ICD-10-CM

## 2022-03-07 DIAGNOSIS — F41.9 ANXIETY: ICD-10-CM

## 2022-03-07 DIAGNOSIS — K62.5 RECTAL BLEED: ICD-10-CM

## 2022-03-07 PROCEDURE — 99214 OFFICE O/P EST MOD 30 MIN: CPT | Performed by: PHYSICIAN ASSISTANT

## 2022-03-07 RX ORDER — DICYCLOMINE HYDROCHLORIDE 10 MG/1
10 CAPSULE ORAL 3 TIMES DAILY
Qty: 90 CAPSULE | Refills: 1 | Status: SHIPPED
Start: 2022-03-07 | End: 2022-05-11 | Stop reason: ALTCHOICE

## 2022-03-07 RX ORDER — OMEPRAZOLE 20 MG/1
20 CAPSULE, DELAYED RELEASE ORAL
Qty: 60 CAPSULE | Refills: 1 | Status: SHIPPED
Start: 2022-03-07 | End: 2022-05-11 | Stop reason: ALTCHOICE

## 2022-03-07 NOTE — PROGRESS NOTES
Chief Complaint   Patient presents with    Diarrhea    Rectal Bleeding    Nausea       HPI:  Patient is here for abdominal discomfort. She alternates with constipation and diarrhea. Even when she has formed stool, she describes mucus in addition. She often has rectal bleeding. She has been having this for 1 year, but recently it has been every time she goes to the bathroom. She has bright red blood and clots in the toilet water. She also has gerd sx, belching and burning after eating. She has anxiety, and wondered if this contributed. She does not want me to do an exam today. She admits that she has looked for hemorrhoids, and does not believe that she has anything external.     Patient's past medical, surgical, social and/or family history reviewed, updated in chart, and are non-contributory (unless otherwise stated). Medications and allergies also reviewed and updated in chart. Review of Systems:  Constitutional:  No fever, no fatigue, no chills, no headaches, no weight change  Dermatology:  No rash, no mole, no dry or sensitive skin  ENT:  No cough, no sore throat, no sinus pain, no runny nose, no ear pain  Cardiology:  No chest pain, no palpitations, no leg edema, no shortness of breath, no PND  Gastroenterology:  No dysphagia,+ abdominal pain,+ nausea, no vomiting, + constipation, + diarrhea, +heartburn  Musculoskeletal:  No joint pain, no leg cramps, no back pain, no muscle aches  Respiratory:  No shortness of breath, no orthopnea, no wheezing, no HOUSER, no hemoptysis  Urology:  No blood in the urine, no urinary frequency, no urinary incontinence, no urinary urgency, no nocturia, no dysuria    Vitals:    03/07/22 1258   BP: 100/70   Pulse: 95   Resp: 18   Temp: 98.2 °F (36.8 °C)   SpO2: 97%   Weight: 125 lb (56.7 kg)   Height: 5' 6\" (1.676 m)       Physical Exam  Constitutional:       General: She is not in acute distress. Appearance: She is well-developed.    HENT:      Head: Normocephalic and atraumatic. Right Ear: External ear normal.      Left Ear: External ear normal.      Nose: Nose normal.   Eyes:      General: No scleral icterus. Conjunctiva/sclera: Conjunctivae normal.      Pupils: Pupils are equal, round, and reactive to light. Neck:      Thyroid: No thyromegaly. Cardiovascular:      Rate and Rhythm: Normal rate and regular rhythm. Heart sounds: Normal heart sounds. No murmur heard. Pulmonary:      Effort: Pulmonary effort is normal. No accessory muscle usage or respiratory distress. Breath sounds: Normal breath sounds. No wheezing. Abdominal:      General: Bowel sounds are normal. There is no distension. Palpations: Abdomen is soft. Tenderness: There is no abdominal tenderness. Musculoskeletal:         General: Normal range of motion. Cervical back: Normal range of motion and neck supple. Skin:     General: Skin is warm and dry. Findings: No rash. Neurological:      Mental Status: She is alert and oriented to person, place, and time. Deep Tendon Reflexes: Reflexes are normal and symmetric. Psychiatric:         Speech: Speech normal.         Behavior: Behavior normal.         Assessment/Plan:      Betsy was seen today for diarrhea, rectal bleeding and nausea. Diagnoses and all orders for this visit:    Irritable bowel syndrome with both constipation and diarrhea  -     dicyclomine (BENTYL) 10 MG capsule; Take 1 capsule by mouth 3 times daily  -     Anselmo Zuniga MD, Gastroenterology, [place]    Rectal bleed  -     Anselmo Zuniga MD, Gastroenterology, [place]    Gastroesophageal reflux disease without esophagitis  -     omeprazole (PRILOSEC) 20 MG delayed release capsule; Take 1 capsule by mouth 2 times daily (before meals)  -     [facility]    Anxiety      As above. Call or go to ED immediately if symptoms worsen or persist.  Return in about 3 months (around 6/7/2022). , or sooner if necessary. Educational materials and/or home exercises printed for patient's review and were included in patient instructions on his/her After Visit Summary and given to patient at the end of visit. Counseled regarding above diagnosis, including possible risks and complications,  especially if left uncontrolled. Counseled regarding the possible side effects, risks, benefits and alternatives to treatment; patient and/or guardian verbalizes understanding, agrees, feels comfortable with and wishes to proceed with above treatment plan. Advised patient to call with any new medication issues, and read all Rx info from pharmacy to assure aware of all possible risks and side effects of medication before taking. Reviewed age and gender appropriate health screening exams and vaccinations. Advised patient regarding importance of keeping up with recommended health maintenance and to schedule as soon as possible if overdue, as this is important in assessing for undiagnosed pathology, especially cancer, as well as protecting against potentially harmful/life threatening disease. Patient and/or guardian verbalizes understanding and agrees with above counseling, assessment and plan. All questions answered. Kishor Beard PA-C  3/7/2022    I have personally reviewed and updated the chief complaint, HPI, Past Medical, Family and Social History, as well as the above Review of Systems.

## 2022-03-15 ENCOUNTER — TELEPHONE (OUTPATIENT)
Dept: GASTROENTEROLOGY | Age: 27
End: 2022-03-15

## 2022-03-15 ENCOUNTER — OFFICE VISIT (OUTPATIENT)
Dept: GASTROENTEROLOGY | Age: 27
End: 2022-03-15
Payer: COMMERCIAL

## 2022-03-15 VITALS
TEMPERATURE: 97 F | DIASTOLIC BLOOD PRESSURE: 72 MMHG | HEIGHT: 66 IN | OXYGEN SATURATION: 98 % | WEIGHT: 127 LBS | RESPIRATION RATE: 18 BRPM | SYSTOLIC BLOOD PRESSURE: 118 MMHG | BODY MASS INDEX: 20.41 KG/M2 | HEART RATE: 70 BPM

## 2022-03-15 DIAGNOSIS — R19.7 DIARRHEA, UNSPECIFIED TYPE: Primary | ICD-10-CM

## 2022-03-15 DIAGNOSIS — K59.00 CONSTIPATION, UNSPECIFIED CONSTIPATION TYPE: ICD-10-CM

## 2022-03-15 DIAGNOSIS — R19.7 DIARRHEA, UNSPECIFIED TYPE: ICD-10-CM

## 2022-03-15 DIAGNOSIS — K62.5 RECTAL BLEEDING: ICD-10-CM

## 2022-03-15 LAB — IGA: 422 MG/DL (ref 70–400)

## 2022-03-15 PROCEDURE — 99202 OFFICE O/P NEW SF 15 MIN: CPT | Performed by: NURSE PRACTITIONER

## 2022-03-15 RX ORDER — SODIUM, POTASSIUM,MAG SULFATES 17.5-3.13G
1 SOLUTION, RECONSTITUTED, ORAL ORAL ONCE
Qty: 1 EACH | Refills: 0 | Status: SHIPPED | OUTPATIENT
Start: 2022-03-15 | End: 2022-03-15

## 2022-03-15 NOTE — PROGRESS NOTES
Marval Goldmann (:  1995) is a 31 y.o. female, here for evaluation of the following chief complaint(s):  New Patient (ref by Zoila Cantu PA-C for IBS)      SUBJECTIVE/OBJECTIVE:  HPI:    Army Archibald is a very pleasant 31year old female that presents today with complaints of diarrhea with mucous and bright red blood. Patient has had diarrhea that alternates with constipation for years. Since having a cholecystectomy last year, the patient is noting more loose stools with mucous and blood. The blood is filling the toilet  There is abdominal pain associated. The rectal bleeding presents about twice a week. The stools start out hard then progress to diarrhea. Having multiple stools a day; usually after a meal.  There have been times when she does not have a BM for 3 days. Will drink tea when the patient feels constipated. Has used imodium for the diarrhea in the past but this caused worsening of constipation. Patient has been awakened on multiple occasions with abdominal pain, loose stools, and rectal bleeding. Has tired dicyclomine but this did not satisfy. Otherwise, does not self medicate. Patient mentions frequent nausea and decreased appetite. Has been tested for pregnancy on more than on occasion. Has an anxiety disorder so she is not sure if this may be the cause. No weight loss. The nausea is not related to bowel movements. Since being placed on a muscle relaxants for neck pain and migraines, the nausea has decreased. Patient feels the nausea was related to migraines and anxiety. No family history of CRC or IBD. Not a smoker. Takes ibuprofen quite frequently. Drinks alcohol on occasion. ROS:  General: Patient denies n/v/f/c or weight loss. HEENT: Patient denies persistent postnasal drip, scleral icterus, drooling, persistent bleeding from nose/mouth. Resp: Patient denies SOB, wheezing, productive cough.   Cards: Patient denies CP, palpitations, significant edema  GI: As above. Derm: Patient denies jaundice/rashes. Musc: Patient denies diffuse/irregular joint swelling or myalgias. Objective   Wt Readings from Last 3 Encounters:   03/15/22 127 lb (57.6 kg)   03/07/22 125 lb (56.7 kg)   02/25/22 123 lb (55.8 kg)     Temp Readings from Last 3 Encounters:   03/15/22 97 °F (36.1 °C) (Infrared)   03/07/22 98.2 °F (36.8 °C)   02/25/22 98.3 °F (36.8 °C)     BP Readings from Last 3 Encounters:   03/15/22 118/72   03/07/22 100/70   02/25/22 90/62     Pulse Readings from Last 3 Encounters:   03/15/22 70   03/07/22 95   02/25/22 100        Physical Exam  Abdominal:      General: Bowel sounds are normal.      Palpations: Abdomen is soft. Comments: Lower abdominal tenderness         Past Medical History:   Diagnosis Date    Anxiety and depression       Past Surgical History:   Procedure Laterality Date    CHOLECYSTECTOMY        Family History   Problem Relation Age of Onset    No Known Problems Mother         Lab Results   Component Value Date    WBC 3.7 (L) 02/22/2022    HGB 13.5 02/22/2022    HCT 39.8 02/22/2022    MCV 91.7 02/22/2022     02/22/2022      Lab Results   Component Value Date     02/22/2022    K 3.8 02/22/2022     02/22/2022    CO2 25 02/22/2022    BUN 8 02/22/2022    CREATININE 0.7 02/22/2022    GLUCOSE 105 (H) 02/22/2022    CALCIUM 9.3 02/22/2022    PROT 6.9 02/18/2021    LABALBU 3.6 02/18/2021    BILITOT 0.9 02/18/2021    ALKPHOS 98 02/18/2021    AST 47 (H) 02/18/2021     (H) 02/18/2021    LABGLOM >60 02/22/2022    GFRAA >60 02/22/2022                       ASSESSMENT/PLAN:    1. Diarrhea, unspecified type  -     Tissue Transglutaminase, IgA; Future  -     IgA; Future  -     XR ABDOMEN (KUB) (SINGLE AP VIEW); Future      -Abdominal xray ordered to rule out overflow diarrhea.  -Labs ordered to rule out celiac sprue  -Patient advised to trial a daily fiber supplement such as Metamucil or Benefiber    2.  Constipation, unspecified constipation type  -     Tissue Transglutaminase, IgA; Future  -     IgA; Future  -     XR ABDOMEN (KUB) (SINGLE AP VIEW); Future    3. Rectal bleeding  -     Tissue Transglutaminase, IgA; Future  -     IgA; Future      -Patients symptoms meet the MARIUSZ IV criteria for IBS. We had a long discussion on the gut brain connection  -Because this young lady is being awakened to her symptoms along with rectal bleeding,  I will advise a colonoscopy. Patient is agreeable to proceed. Schedule colonoscopy under MAC anesthesia  Literature given. Periprocedural hydration advised. The risks and alternatives were explained as per the ASGE guidelines (I.  E.  Perforation, 3% chance of bleeding, reaction to medication, missed colon lesions,  infections, and death). Return for Follow up with Dr. Brianda Jones post procedure. An electronic signature was used to authenticate this note.     --JOSSY Uriarte - CNP

## 2022-03-18 LAB — TISSUE TRANSGLUTAMINASE IGA: 1 U/ML

## 2022-03-24 RX ORDER — POLYETHYLENE GLYCOL 3350, SODIUM SULFATE ANHYDROUS, SODIUM BICARBONATE, SODIUM CHLORIDE, POTASSIUM CHLORIDE 236; 22.74; 6.74; 5.86; 2.97 G/4L; G/4L; G/4L; G/4L; G/4L
4 POWDER, FOR SOLUTION ORAL ONCE
Qty: 4000 ML | Refills: 0 | Status: SHIPPED
Start: 2022-07-01 | End: 2022-05-11 | Stop reason: ALTCHOICE

## 2022-04-13 ENCOUNTER — OFFICE VISIT (OUTPATIENT)
Dept: NEUROLOGY | Age: 27
End: 2022-04-13
Payer: COMMERCIAL

## 2022-04-13 VITALS
DIASTOLIC BLOOD PRESSURE: 86 MMHG | WEIGHT: 128.5 LBS | HEIGHT: 67 IN | SYSTOLIC BLOOD PRESSURE: 119 MMHG | BODY MASS INDEX: 20.17 KG/M2 | OXYGEN SATURATION: 96 % | HEART RATE: 114 BPM

## 2022-04-13 DIAGNOSIS — R42 VERTIGO: ICD-10-CM

## 2022-04-13 DIAGNOSIS — R51.9 CHRONIC DAILY HEADACHE: Primary | ICD-10-CM

## 2022-04-13 PROCEDURE — 99204 OFFICE O/P NEW MOD 45 MIN: CPT | Performed by: NURSE PRACTITIONER

## 2022-04-13 PROCEDURE — G8420 CALC BMI NORM PARAMETERS: HCPCS | Performed by: NURSE PRACTITIONER

## 2022-04-13 PROCEDURE — G8427 DOCREV CUR MEDS BY ELIG CLIN: HCPCS | Performed by: NURSE PRACTITIONER

## 2022-04-13 PROCEDURE — 1036F TOBACCO NON-USER: CPT | Performed by: NURSE PRACTITIONER

## 2022-04-13 RX ORDER — BACLOFEN 10 MG/1
10 TABLET ORAL 2 TIMES DAILY
Qty: 60 TABLET | Refills: 0 | Status: SHIPPED
Start: 2022-04-13 | End: 2022-05-11 | Stop reason: ALTCHOICE

## 2022-04-13 NOTE — PROGRESS NOTES
[facility]. Lisha Weston M.D., F.A.C.P. Jairo Damon, CAMI, APRN, CNS  Loulou Anderson. Jimena Fox, MSN, APRN-FNP-C  Mona Lemos MSN, APRN, FNP-C  Shaina LEWIS PA-C  [address] MSN, APRN, FNP-C  [address], [address] [place], [address] [address]  Phone: [phone]  Fax: [phone]       Ericka Guaman is a 31 y.o. right handed female     Patient presents to neurology clinic today for evaluation and management of chronic headaches. Patient presents alone and is deemed a good historian. Past Medical History:     Past Medical History:   Diagnosis Date    Anxiety and depression      Past Surgical History:       Past Surgical History:   Procedure Laterality Date    CHOLECYSTECTOMY       Allergies:       Pcn [penicillins]    Medications:     Prior to Admission medications    Medication Sig Start Date End Date Taking? Authorizing Provider   baclofen (LIORESAL) 10 MG tablet Take 1 tablet by mouth 2 times daily 4/13/22 5/13/22 Yes Durenda Hodgkin V, APRN - NP   dicyclomine (BENTYL) 10 MG capsule Take 1 capsule by mouth 3 times daily 3/7/22  Yes Alejandro Hernandez PA-C   omeprazole (PRILOSEC) 20 MG delayed release capsule Take 1 capsule by mouth 2 times daily (before meals) 3/7/22  Yes Alejandro Hernandez PA-C   vitamin D3 (CHOLECALCIFEROL) 25 MCG (1000 UT) TABS tablet Take 1 tablet by mouth daily 3/1/22  Yes Alejandro Hernandez PA-C   escitalopram (LEXAPRO) 20 MG tablet Take 20 mg by mouth daily   Yes Historical Provider, MD   polyethylene glycol (GOLYTELY) 236 g solution Take 4,000 mLs by mouth once for 1 dose  Patient not taking: Reported on 4/13/2022 7/1/22 7/1/22  JOSSY Champion - CNP   Cyanocobalamin (VITAMIN B 12 PO) Take by mouth  Patient not taking: Reported on 4/13/2022    Historical Provider, MD       Social History:        reports that she has never smoked. She has never used smokeless tobacco. She reports previous alcohol use. She reports previous drug use. Patient is single and has no children. 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 Review of Systems:     (+) Headaches  No chest pain or palpitations  No SOB  No vertigo, lightheadedness or loss of consciousness  No falls, tripping or stumbling  No incontinence of bowels or bladder  No itching or bruising appreciated  No numbness, tingling or focal arm/leg weakness    ROS is otherwise negative    Family History:     Family History   Problem Relation Age of Onset    No Known Problems Mother         History of Present Illness:     Patient presents for evaluation management of chronic headache. Patient states that she has had headaches her entire life. Patient has used over-the-counter's in the past which were not helpful. Patient says within the last 6 months her headaches have worsened. Patient describes these headaches as occipital region with stabbing pain. Patient has associated symptoms of light and sound sensitivity, nausea and vomiting. Patient usually wakes up with these at around 8/10 in pain but by the evening they are worse 10/10. Patient wakes with these. Patient was placed on Flexeril about a month and a half ago however does not take them as often because she feels exhausted and like \"Jell-O\". Over-the-counter's in the past have not helped. Patient also has began feeling vertigo within the last few months. Patient will roll over her sleep and feel overwhelming room spinning sensation. This is happened 3 times in 1 night. When patient got out of bed she fell. Patient now has an occasional room spinning sensation but is not as severe. Patient has learned not to get up as quickly which helps with the sensation. Patient denies focal weakness, numbness or tingling of her extremities, speech or swallowing difficulty. Patient had CT head completed in February which was unrevealing for acute intracranial pathology.     Patient also suffers from anxiety and depression currently strength  5/5   XII: tongue strength  Normal     Motor:  Strong bilateral handgrips  5/5 throughout  Normal bulk and tone  No drift   No abnormal movements    Sensory:  LT and PP normal  Vibration normal    Coordination:   FN, FFM and SAPPHIRE normal  HS normal    Gait:  Normal  Walks well on toes, heels, and tandem    DTR:   Right Brachioradialis reflex 1+  Left Brachioradialis reflex 1+  Right Biceps reflex 1+  Left Biceps reflex 1+  Right Triceps reflex 1+  Left Triceps reflex 1+  Right Quadriceps reflex 1+  Left Quadriceps reflex 1+  Right Achilles reflex 1+  Left Achilles reflex 1+    No Babinskis  No Strauss's    No other pathological reflexes    Laboratory/Radiology:  ry/Radiology:     CBC:   Lab Results   Component Value Date    WBC 3.7 02/22/2022    RBC 4.34 02/22/2022    HGB 13.5 02/22/2022    HCT 39.8 02/22/2022    MCV 91.7 02/22/2022    MCH 31.1 02/22/2022    MCHC 33.9 02/22/2022    RDW 12.6 02/22/2022     02/22/2022    MPV 10.0 02/22/2022     CMP:    Lab Results   Component Value Date     02/22/2022    K 3.8 02/22/2022     02/22/2022    CO2 25 02/22/2022    BUN 8 02/22/2022    CREATININE 0.7 02/22/2022    GFRAA >60 02/22/2022    LABGLOM >60 02/22/2022    GLUCOSE 105 02/22/2022    PROT 6.9 02/18/2021    LABALBU 3.6 02/18/2021    CALCIUM 9.3 02/22/2022    BILITOT 0.9 02/18/2021    ALKPHOS 98 02/18/2021    AST 47 02/18/2021     02/18/2021     U/A:    Lab Results   Component Value Date    COLORU Yellow 02/22/2022    PROTEINU Negative 02/22/2022    PHUR 7.0 02/22/2022    WBCUA 1-3 02/22/2022    RBCUA 0-1 02/22/2022    BACTERIA NONE SEEN 02/22/2022    CLARITYU Clear 02/22/2022    SPECGRAV <=1.005 02/22/2022    LEUKOCYTESUR TRACE 02/22/2022    UROBILINOGEN 0.2 02/22/2022    BILIRUBINUR Negative 02/22/2022    BLOODU Negative 02/22/2022    GLUCOSEU Negative 02/22/2022   TSH:    Lab Results   Component Value Date    TSH 1.210 02/25/2022     CT Head 2/22/22:   No acute intracranial abnormality.  Specifically, there is no intracranial   hemorrhage or mass.  If the patient's symptoms persist dedicated more   sensitive MRI is recommended. All labs and images were personally reviewed at the time of this visit    Assessment:     Chronic daily headache   Has had headaches most of her life, worsening in the last 6 months   Headaches are in the occipital region described as stabbing pain   Associated symptoms of light and sound sensitivity, nausea and vomiting   Patient typically wakes with these and they become more severe throughout the day   Unrelieved by over-the-counter; Flexeril causes exhaustion and \"feeling out of my body\"   Discontinue Flexeril; will try low-dose baclofen twice daily    Vertigo   3 times in 1 night while sleeping; got out of the bed and fell   Described as room spinning sensation   This has improved with now only an occasional room spinning sensation and not as severe   Patient changes positions slowly now which has helped   Will monitor this in future visits    Plan:     Low-dose baclofen twice daily   Written education provided today  Patient will keep headache diary  Call with any issues  Report to office in 4 months        JOSSY Grey - NP-C   9:47 AM  4/13/2022    I spent 45 minutes with this patient obtaining the HPI and discussing the exam with greater than 50% of the time providing counseling and education on medications and other treatment plans. All questions were answered prior to leaving my office.

## 2022-04-13 NOTE — PATIENT INSTRUCTIONS
Patient Education        baclofen (oral)  Pronunciation: ALEC roca  Brand: FIRST Baclofen, Ozobax  What is the most important information I should know about baclofen? Use only as directed. Tell your doctor if you use other medicines or have othermedical conditions or allergies. Do not stop using baclofen suddenly, or you could have unpleasant withdrawal symptoms. What is baclofen? Baclofen is a muscle relaxer that is used to treat muscle pain, spasms, andstiffness in people with multiple sclerosis or spinal cord injury or disease. Baclofen may also be used for purposes not listed in this medication guide. What should I discuss with my healthcare provider before taking baclofen? You should not use baclofen if you are allergic to it. Tell your doctor if you have ever had:   mental illness or psychosis;   a nervous system disorder;   epilepsy or other seizure disorder;   a stroke or blood clot; or   kidney disease. Using baclofen may increase your risk of developing an ovarian cyst. Talk withyour doctor about your specific risk. Tell your doctor if you are pregnant or breastfeeding. If you take baclofen during pregnancy, your  baby may have withdrawal symptoms such as tremors, rigid muscles, or a seizure. Follow your doctor's instructions about tapering your dose asyour due date approaches. If you take baclofen while breastfeeding, withdrawal symptoms may occur in the nursing baby. Ask your doctor if it issafe for you to breastfeed while taking this medicine. Not approved for use by anyone younger than 15years old. How should I take baclofen? Follow all directions on your prescription label and read all medication guides or instruction sheets. Your doctor may occasionally change your dose. Use themedicine exactly as directed. Shake the oral suspension (liquid). Measure a dose with the supplied measuring device (not a kitchenspoon).   Call your doctor if your symptoms do not improve, or if they get worse. You should not stop using baclofen suddenly or you could have serious or fatal withdrawal symptoms. Ask your doctor before stopping the medicine. Store at room temperature away from moisture and heat. What happens if I miss a dose? Take the medicine as soon as you can, but skip the missed dose if it is almost time for your next dose. Do not take two doses at one time. What happens if I overdose? Seek emergency medical attention or call the Poison Help line at 1-232.189.2947. Overdose symptoms may include muscle weakness, vomiting, severe dizziness or drowsiness, dilated or pinpoint pupils, shallow breathing, seizure, or loss ofconsciousness. What should I avoid while taking baclofen? Do not use baclofen at a time when you need muscle tone for safe balance and movement during certain activities. In some situations, it may be dangerous for you to have reduced muscle tone. Avoid drinking alcohol. Avoid driving or hazardous activity until you know how this medicine willaffect you. Your reactions could be impaired. What are the possible side effects of baclofen? Get emergency medical help if you have signs of an allergic reaction: hives; difficult breathing; swelling of your face, lips, tongue, or throat. Call your doctor at once if you have:   severe drowsiness, weak or shallow breathing;   confusion, hallucinations;   itching, tingling, or twitching in your hands, arms, feet, or legs;   fever; or   a seizure. Common side effects may include:   drowsiness, dizziness, weakness, tiredness;   headache;   sleep problems (insomnia);   nausea, constipation; or   urinating more often than usual.  This is not a complete list of side effects and others may occur. Call your doctor for medical advice about side effects. You may report side effects toFDA at 0-054-BRG-8324. What other drugs will affect baclofen? Using baclofen with other drugs that make you drowsy can worsen this effect. Ask your doctor before using opioid medication, a sleeping pill, a musclerelaxer, or medicine for anxiety or seizures. Other drugs may affect baclofen, including prescription and over-the-counter medicines, vitamins, and herbal products. Tell your doctor about all othermedicines you use. Where can I get more information? Your pharmacist can provide more information about baclofen. Remember, keep this and all other medicines out of the reach of children, never share your medicines with others, and use this medication only for the indication prescribed. Every effort has been made to ensure that the information provided by [address] is accurate, up-to-date, and complete, but no guarantee is made to that effect. Drug information contained herein may be time sensitive. [facility] information has been compiled for use by healthcare practitioners and consumers in the [place] and therefore [employer] does not warrant that uses outside of the [place] are appropriate, unless specifically indicated otherwise. [facility]'s drug information does not endorse drugs, diagnose patients or recommend therapy. [facility] drug information is an informational resource designed to assist licensed healthcare practitioners in caring for their patients and/or to serve consumers viewing this service as a supplement to, and not a substitute for, the expertise, skill, knowledge and judgment of healthcare practitioners. The absence of a warning for a given drug or drug combination in no way should be construed to indicate that the drug or drug combination is safe, effective or appropriate for any given patient. [employer] does not assume any responsibility for any aspect of healthcare administered with the aid of information [facility] provides. The information contained herein is not intended to cover all possible uses, directions, precautions, warnings, drug interactions, allergic reactions, or adverse effects.  If you have

## 2022-04-14 ENCOUNTER — TELEPHONE (OUTPATIENT)
Dept: GASTROENTEROLOGY | Age: 27
End: 2022-04-14

## 2022-04-14 NOTE — TELEPHONE ENCOUNTER
Prior Authorization Form:      DEMOGRAPHICS:                     Patient Name:  Rona Maldonado  Patient :  1995            Insurance:  Payor: Mulugeta Gomes / Plan: Mulugeta Gomes - [place] PPO / Product Type: *No Product type* /   Insurance ID Number:    Payor/Plan Subscr  Sex Relation Sub. Ins. ID Effective Group Num   1. [address] 1995 Female Self [id number] 22 [id number]                                   [address]   2.  [address] 1995 Female Self [id number] 10/1/20 [place]                                   [address]         DIAGNOSIS & PROCEDURE:                       Procedure/Operation: Colonoscopy           CPT Code: 45571    Diagnosis:  Rectal Bleeding    ICD10 Code: K62.5    Location:  [facility]    Surgeon:  Dr Lakisha Leon    SCHEDULING INFORMATION:                          Date: 2022    Time: 1100              Anesthesia:  MAC/TIVA                                                       Status:  Outpatient          Electronically signed by Leny Laughlin MA on 2022 at 3:36 PM

## 2022-05-05 ENCOUNTER — TELEPHONE (OUTPATIENT)
Dept: GASTROENTEROLOGY | Age: 27
End: 2022-05-05

## 2022-05-05 DIAGNOSIS — K62.5 RECTAL BLEEDING: Primary | ICD-10-CM

## 2022-05-05 NOTE — TELEPHONE ENCOUNTER
Patient agreed to switch to [facility] for her colonoscopy which is currently scheduled for 8/11/22 with Dr Rosa Corbett. Advised her that she may be required to have an initial consult, they will go over surgery instructions, prep and do the post op.

## 2022-05-10 ENCOUNTER — HOSPITAL ENCOUNTER (EMERGENCY)
Age: 27
Discharge: LEFT BEFORE DISCHARGE | End: 2022-05-11
Payer: COMMERCIAL

## 2022-05-10 VITALS
DIASTOLIC BLOOD PRESSURE: 101 MMHG | TEMPERATURE: 97.4 F | BODY MASS INDEX: 20.05 KG/M2 | WEIGHT: 128 LBS | HEART RATE: 120 BPM | SYSTOLIC BLOOD PRESSURE: 127 MMHG | RESPIRATION RATE: 16 BRPM | OXYGEN SATURATION: 97 %

## 2022-05-10 LAB
ANION GAP SERPL CALCULATED.3IONS-SCNC: 13 MMOL/L (ref 7–16)
BUN BLDV-MCNC: 9 MG/DL (ref 6–20)
CALCIUM SERPL-MCNC: 9.4 MG/DL (ref 8.6–10.2)
CHLORIDE BLD-SCNC: 101 MMOL/L (ref 98–107)
CO2: 23 MMOL/L (ref 22–29)
CREAT SERPL-MCNC: 0.8 MG/DL (ref 0.5–1)
GFR AFRICAN AMERICAN: >60
GFR NON-AFRICAN AMERICAN: >60 ML/MIN/1.73
GLUCOSE BLD-MCNC: 106 MG/DL (ref 74–99)
HCT VFR BLD CALC: 39.6 % (ref 34–48)
HEMOGLOBIN: 13.4 G/DL (ref 11.5–15.5)
MCH RBC QN AUTO: 30.9 PG (ref 26–35)
MCHC RBC AUTO-ENTMCNC: 33.8 % (ref 32–34.5)
MCV RBC AUTO: 91.5 FL (ref 80–99.9)
PDW BLD-RTO: 12.4 FL (ref 11.5–15)
PLATELET # BLD: 190 E9/L (ref 130–450)
PMV BLD AUTO: 9.6 FL (ref 7–12)
POTASSIUM SERPL-SCNC: 3.8 MMOL/L (ref 3.5–5)
RBC # BLD: 4.33 E12/L (ref 3.5–5.5)
SODIUM BLD-SCNC: 137 MMOL/L (ref 132–146)
T4 FREE: 1.25 NG/DL (ref 0.93–1.7)
TROPONIN, HIGH SENSITIVITY: <6 NG/L (ref 0–9)
TSH SERPL DL<=0.05 MIU/L-ACNC: 2.12 UIU/ML (ref 0.27–4.2)
WBC # BLD: 4.5 E9/L (ref 4.5–11.5)

## 2022-05-10 PROCEDURE — 85027 COMPLETE CBC AUTOMATED: CPT

## 2022-05-10 PROCEDURE — 36415 COLL VENOUS BLD VENIPUNCTURE: CPT

## 2022-05-10 PROCEDURE — 80048 BASIC METABOLIC PNL TOTAL CA: CPT

## 2022-05-10 PROCEDURE — 99284 EMERGENCY DEPT VISIT MOD MDM: CPT

## 2022-05-10 PROCEDURE — 84484 ASSAY OF TROPONIN QUANT: CPT

## 2022-05-10 PROCEDURE — 84443 ASSAY THYROID STIM HORMONE: CPT

## 2022-05-10 PROCEDURE — 93005 ELECTROCARDIOGRAM TRACING: CPT | Performed by: PHYSICIAN ASSISTANT

## 2022-05-10 PROCEDURE — 84439 ASSAY OF FREE THYROXINE: CPT

## 2022-05-10 RX ORDER — 0.9 % SODIUM CHLORIDE 0.9 %
1000 INTRAVENOUS SOLUTION INTRAVENOUS ONCE
Status: DISCONTINUED | OUTPATIENT
Start: 2022-05-10 | End: 2022-05-11 | Stop reason: HOSPADM

## 2022-05-10 ASSESSMENT — PAIN - FUNCTIONAL ASSESSMENT: PAIN_FUNCTIONAL_ASSESSMENT: NONE - DENIES PAIN

## 2022-05-10 NOTE — ED NOTES
Department of Emergency Medicine  FIRST PROVIDER TRIAGE NOTE             Independent MLP           5/10/22  4:14 PM EDT    Date of Encounter: 5/10/22   MRN: [id number]      HPI: Kaushal Contreras is a 31 y.o. female who presents to the ED for Palpitations (patient states intermittent palpitations, dizziness and passing out when standing), Dizziness, and Loss of Consciousness       ROS: Negative for abd pain, back pain, fever, cough, vomiting or diarrhea. PE: Gen Appearance/Constitutional: alert  HEENT: NC/NT. PERRLA,  Airway patent. Neck: supple  CV: tachycardia     Initial Plan of Care: All treatment areas with department are currently occupied. Plan to order/Initiate the following while awaiting opening in ED: labs, EKG and imaging studies.   Initiate Treatment-Testing, Proceed toTreatment Area When Bed Available for ED Attending/MLP to Continue Care    Electronically signed by YURIY Whitmore   DD: 5/10/22         YURIY Ramachandran  05/10/22 [id number]

## 2022-05-11 ENCOUNTER — OFFICE VISIT (OUTPATIENT)
Dept: PRIMARY CARE CLINIC | Age: 27
End: 2022-05-11
Payer: COMMERCIAL

## 2022-05-11 ENCOUNTER — HOSPITAL ENCOUNTER (OUTPATIENT)
Age: 27
Setting detail: OBSERVATION
Discharge: HOME | End: 2022-05-13
Attending: EMERGENCY MEDICINE | Admitting: FAMILY MEDICINE
Payer: COMMERCIAL

## 2022-05-11 ENCOUNTER — APPOINTMENT (OUTPATIENT)
Dept: GENERAL RADIOLOGY | Age: 27
End: 2022-05-11
Payer: COMMERCIAL

## 2022-05-11 VITALS
OXYGEN SATURATION: 98 % | HEART RATE: 132 BPM | SYSTOLIC BLOOD PRESSURE: 88 MMHG | WEIGHT: 126 LBS | TEMPERATURE: 97.3 F | DIASTOLIC BLOOD PRESSURE: 70 MMHG | RESPIRATION RATE: 18 BRPM | HEIGHT: 67 IN | BODY MASS INDEX: 19.78 KG/M2

## 2022-05-11 DIAGNOSIS — R55 SYNCOPE, UNSPECIFIED SYNCOPE TYPE: Primary | ICD-10-CM

## 2022-05-11 DIAGNOSIS — R00.0 TACHYCARDIA: ICD-10-CM

## 2022-05-11 DIAGNOSIS — R42 DIZZINESS ON STANDING: ICD-10-CM

## 2022-05-11 DIAGNOSIS — R06.02 SHORTNESS OF BREATH: ICD-10-CM

## 2022-05-11 DIAGNOSIS — R55 SYNCOPE AND COLLAPSE: Primary | ICD-10-CM

## 2022-05-11 DIAGNOSIS — K21.9 GASTROESOPHAGEAL REFLUX DISEASE WITHOUT ESOPHAGITIS: ICD-10-CM

## 2022-05-11 LAB
ALBUMIN SERPL-MCNC: 4.3 G/DL (ref 3.5–5.2)
ALP BLD-CCNC: 43 U/L (ref 35–104)
ALT SERPL-CCNC: 8 U/L (ref 0–32)
ANION GAP SERPL CALCULATED.3IONS-SCNC: 9 MMOL/L (ref 7–16)
AST SERPL-CCNC: 17 U/L (ref 0–31)
BASOPHILS ABSOLUTE: 0.02 E9/L (ref 0–0.2)
BASOPHILS RELATIVE PERCENT: 0.7 % (ref 0–2)
BILIRUB SERPL-MCNC: 0.7 MG/DL (ref 0–1.2)
BUN BLDV-MCNC: 12 MG/DL (ref 6–20)
CALCIUM SERPL-MCNC: 8.7 MG/DL (ref 8.6–10.2)
CHLORIDE BLD-SCNC: 102 MMOL/L (ref 98–107)
CO2: 24 MMOL/L (ref 22–29)
CREAT SERPL-MCNC: 0.8 MG/DL (ref 0.5–1)
D DIMER: <200 NG/ML DDU
EKG ATRIAL RATE: 90 BPM
EKG P AXIS: 75 DEGREES
EKG P-R INTERVAL: 138 MS
EKG Q-T INTERVAL: 386 MS
EKG QRS DURATION: 90 MS
EKG QTC CALCULATION (BAZETT): 472 MS
EKG R AXIS: 150 DEGREES
EKG T AXIS: 32 DEGREES
EKG VENTRICULAR RATE: 90 BPM
EOSINOPHILS ABSOLUTE: 0.08 E9/L (ref 0.05–0.5)
EOSINOPHILS RELATIVE PERCENT: 2.8 % (ref 0–6)
GFR AFRICAN AMERICAN: >60
GFR NON-AFRICAN AMERICAN: >60 ML/MIN/1.73
GLUCOSE BLD-MCNC: 91 MG/DL (ref 74–99)
HCG, URINE, POC: NEGATIVE
HCT VFR BLD CALC: 35.7 % (ref 34–48)
HEMOGLOBIN: 12.4 G/DL (ref 11.5–15.5)
IMMATURE GRANULOCYTES #: 0 E9/L
IMMATURE GRANULOCYTES %: 0 % (ref 0–5)
LYMPHOCYTES ABSOLUTE: 1.27 E9/L (ref 1.5–4)
LYMPHOCYTES RELATIVE PERCENT: 45 % (ref 20–42)
Lab: NORMAL
MCH RBC QN AUTO: 31.5 PG (ref 26–35)
MCHC RBC AUTO-ENTMCNC: 34.7 % (ref 32–34.5)
MCV RBC AUTO: 90.6 FL (ref 80–99.9)
MONOCYTES ABSOLUTE: 0.29 E9/L (ref 0.1–0.95)
MONOCYTES RELATIVE PERCENT: 10.3 % (ref 2–12)
NEGATIVE QC PASS/FAIL: NORMAL
NEUTROPHILS ABSOLUTE: 1.16 E9/L (ref 1.8–7.3)
NEUTROPHILS RELATIVE PERCENT: 41.2 % (ref 43–80)
PDW BLD-RTO: 12.2 FL (ref 11.5–15)
PLATELET # BLD: 173 E9/L (ref 130–450)
PMV BLD AUTO: 9.7 FL (ref 7–12)
POSITIVE QC PASS/FAIL: NORMAL
POTASSIUM REFLEX MAGNESIUM: 4 MMOL/L (ref 3.5–5)
RBC # BLD: 3.94 E12/L (ref 3.5–5.5)
SODIUM BLD-SCNC: 135 MMOL/L (ref 132–146)
TOTAL PROTEIN: 7.2 G/DL (ref 6.4–8.3)
TROPONIN, HIGH SENSITIVITY: <6 NG/L (ref 0–9)
WBC # BLD: 2.8 E9/L (ref 4.5–11.5)

## 2022-05-11 PROCEDURE — 6370000000 HC RX 637 (ALT 250 FOR IP): Performed by: NURSE PRACTITIONER

## 2022-05-11 PROCEDURE — G0378 HOSPITAL OBSERVATION PER HR: HCPCS

## 2022-05-11 PROCEDURE — 99220 PR INITIAL OBSERVATION CARE/DAY 70 MINUTES: CPT | Performed by: FAMILY MEDICINE

## 2022-05-11 PROCEDURE — 2580000003 HC RX 258: Performed by: STUDENT IN AN ORGANIZED HEALTH CARE EDUCATION/TRAINING PROGRAM

## 2022-05-11 PROCEDURE — G8427 DOCREV CUR MEDS BY ELIG CLIN: HCPCS | Performed by: PHYSICIAN ASSISTANT

## 2022-05-11 PROCEDURE — 93005 ELECTROCARDIOGRAM TRACING: CPT | Performed by: STUDENT IN AN ORGANIZED HEALTH CARE EDUCATION/TRAINING PROGRAM

## 2022-05-11 PROCEDURE — 85025 COMPLETE CBC W/AUTO DIFF WBC: CPT

## 2022-05-11 PROCEDURE — 1036F TOBACCO NON-USER: CPT | Performed by: PHYSICIAN ASSISTANT

## 2022-05-11 PROCEDURE — 96361 HYDRATE IV INFUSION ADD-ON: CPT

## 2022-05-11 PROCEDURE — 6360000002 HC RX W HCPCS: Performed by: NURSE PRACTITIONER

## 2022-05-11 PROCEDURE — 80053 COMPREHEN METABOLIC PANEL: CPT

## 2022-05-11 PROCEDURE — 84484 ASSAY OF TROPONIN QUANT: CPT

## 2022-05-11 PROCEDURE — 93010 ELECTROCARDIOGRAM REPORT: CPT | Performed by: INTERNAL MEDICINE

## 2022-05-11 PROCEDURE — 96372 THER/PROPH/DIAG INJ SC/IM: CPT

## 2022-05-11 PROCEDURE — 96360 HYDRATION IV INFUSION INIT: CPT

## 2022-05-11 PROCEDURE — 99214 OFFICE O/P EST MOD 30 MIN: CPT | Performed by: PHYSICIAN ASSISTANT

## 2022-05-11 PROCEDURE — 99285 EMERGENCY DEPT VISIT HI MDM: CPT

## 2022-05-11 PROCEDURE — 2580000003 HC RX 258: Performed by: NURSE PRACTITIONER

## 2022-05-11 PROCEDURE — 71045 X-RAY EXAM CHEST 1 VIEW: CPT

## 2022-05-11 PROCEDURE — G8420 CALC BMI NORM PARAMETERS: HCPCS | Performed by: PHYSICIAN ASSISTANT

## 2022-05-11 PROCEDURE — APPSS45 APP SPLIT SHARED TIME 31-45 MINUTES: Performed by: NURSE PRACTITIONER

## 2022-05-11 PROCEDURE — 85378 FIBRIN DEGRADE SEMIQUANT: CPT

## 2022-05-11 RX ORDER — SODIUM CHLORIDE 0.9 % (FLUSH) 0.9 %
5-40 SYRINGE (ML) INJECTION PRN
Status: DISCONTINUED | OUTPATIENT
Start: 2022-05-11 | End: 2022-05-13 | Stop reason: HOSPADM

## 2022-05-11 RX ORDER — ENOXAPARIN SODIUM 100 MG/ML
40 INJECTION SUBCUTANEOUS DAILY
Status: DISCONTINUED | OUTPATIENT
Start: 2022-05-11 | End: 2022-05-13 | Stop reason: HOSPADM

## 2022-05-11 RX ORDER — SODIUM CHLORIDE 0.9 % (FLUSH) 0.9 %
5-40 SYRINGE (ML) INJECTION EVERY 12 HOURS SCHEDULED
Status: DISCONTINUED | OUTPATIENT
Start: 2022-05-11 | End: 2022-05-13 | Stop reason: HOSPADM

## 2022-05-11 RX ORDER — ESCITALOPRAM OXALATE 10 MG/1
20 TABLET ORAL DAILY
Status: DISCONTINUED | OUTPATIENT
Start: 2022-05-11 | End: 2022-05-13 | Stop reason: HOSPADM

## 2022-05-11 RX ORDER — 0.9 % SODIUM CHLORIDE 0.9 %
1000 INTRAVENOUS SOLUTION INTRAVENOUS ONCE
Status: COMPLETED | OUTPATIENT
Start: 2022-05-11 | End: 2022-05-11

## 2022-05-11 RX ORDER — ACETAMINOPHEN 650 MG/1
650 SUPPOSITORY RECTAL EVERY 6 HOURS PRN
Status: DISCONTINUED | OUTPATIENT
Start: 2022-05-11 | End: 2022-05-13 | Stop reason: HOSPADM

## 2022-05-11 RX ORDER — SODIUM CHLORIDE 9 MG/ML
INJECTION, SOLUTION INTRAVENOUS PRN
Status: DISCONTINUED | OUTPATIENT
Start: 2022-05-11 | End: 2022-05-13 | Stop reason: HOSPADM

## 2022-05-11 RX ORDER — ACETAMINOPHEN 325 MG/1
650 TABLET ORAL EVERY 6 HOURS PRN
Status: DISCONTINUED | OUTPATIENT
Start: 2022-05-11 | End: 2022-05-13 | Stop reason: HOSPADM

## 2022-05-11 RX ORDER — ONDANSETRON 4 MG/1
4 TABLET, ORALLY DISINTEGRATING ORAL EVERY 8 HOURS PRN
Status: DISCONTINUED | OUTPATIENT
Start: 2022-05-11 | End: 2022-05-13 | Stop reason: HOSPADM

## 2022-05-11 RX ORDER — POLYETHYLENE GLYCOL 3350 17 G/17G
17 POWDER, FOR SOLUTION ORAL DAILY PRN
Status: DISCONTINUED | OUTPATIENT
Start: 2022-05-11 | End: 2022-05-13 | Stop reason: HOSPADM

## 2022-05-11 RX ORDER — IBUPROFEN 400 MG/1
600 TABLET ORAL ONCE
Status: COMPLETED | OUTPATIENT
Start: 2022-05-11 | End: 2022-05-11

## 2022-05-11 RX ORDER — PANTOPRAZOLE SODIUM 40 MG/1
40 TABLET, DELAYED RELEASE ORAL
Status: DISCONTINUED | OUTPATIENT
Start: 2022-05-12 | End: 2022-05-13 | Stop reason: HOSPADM

## 2022-05-11 RX ORDER — ONDANSETRON 2 MG/ML
4 INJECTION INTRAMUSCULAR; INTRAVENOUS EVERY 6 HOURS PRN
Status: DISCONTINUED | OUTPATIENT
Start: 2022-05-11 | End: 2022-05-13 | Stop reason: HOSPADM

## 2022-05-11 RX ADMIN — SODIUM CHLORIDE 1000 ML: 9 INJECTION, SOLUTION INTRAVENOUS at 14:00

## 2022-05-11 RX ADMIN — IBUPROFEN 600 MG: 400 TABLET, FILM COATED ORAL at 20:16

## 2022-05-11 RX ADMIN — SODIUM CHLORIDE, PRESERVATIVE FREE 10 ML: 5 INJECTION INTRAVENOUS at 19:57

## 2022-05-11 RX ADMIN — ACETAMINOPHEN 650 MG: 325 TABLET ORAL at 17:37

## 2022-05-11 RX ADMIN — ENOXAPARIN SODIUM 40 MG: 100 INJECTION SUBCUTANEOUS at 17:37

## 2022-05-11 RX ADMIN — ESCITALOPRAM OXALATE 20 MG: 10 TABLET ORAL at 17:37

## 2022-05-11 ASSESSMENT — PAIN DESCRIPTION - PAIN TYPE
TYPE: CHRONIC PAIN
TYPE: ACUTE PAIN

## 2022-05-11 ASSESSMENT — ENCOUNTER SYMPTOMS
NAUSEA: 0
ABDOMINAL DISTENTION: 0
SHORTNESS OF BREATH: 0
VOMITING: 0
ABDOMINAL PAIN: 0
SORE THROAT: 0
BACK PAIN: 0
DIARRHEA: 0
COUGH: 0
CHEST TIGHTNESS: 1

## 2022-05-11 ASSESSMENT — PAIN DESCRIPTION - DESCRIPTORS
DESCRIPTORS: ACHING

## 2022-05-11 ASSESSMENT — PAIN DESCRIPTION - ORIENTATION: ORIENTATION: POSTERIOR

## 2022-05-11 ASSESSMENT — PAIN - FUNCTIONAL ASSESSMENT: PAIN_FUNCTIONAL_ASSESSMENT: ACTIVITIES ARE NOT PREVENTED

## 2022-05-11 ASSESSMENT — LIFESTYLE VARIABLES: HOW OFTEN DO YOU HAVE A DRINK CONTAINING ALCOHOL: NEVER

## 2022-05-11 ASSESSMENT — PAIN DESCRIPTION - LOCATION
LOCATION: HEAD
LOCATION: HEAD

## 2022-05-11 ASSESSMENT — PAIN SCALES - GENERAL
PAINLEVEL_OUTOF10: 8
PAINLEVEL_OUTOF10: 6

## 2022-05-11 NOTE — PROGRESS NOTES
Chief Complaint   Patient presents with    Palpitations     when she stands up her heart rate goes up and she feels like she going to pass out     Shortness of Breath       HPI:  Patient is here dizziness and tachycardia. This has been ongoing for 3 weeks. She has had multiple episodes of syncope when she stands up. She has complete LOC. She feels her heart is racing. She also feels weakness and sob. Sometimes she can avoid syncope by sitting down, but she remains dizzy. She was in the ER yesterday for 11.5 hours and was not seen, so she signed out. She has increased her water, is eating food and drinking Pedialyte. This has not helped. ekg done yesterday was reviewed. Labs were essentially normal. No electrolyte abnormalities. There was no mag or thyroid studies done. We did orthostatic bp, and they were not diagnostic, but her pulse did increase from 72 to 132 and she was near syncopal. Staff had to help her back on to the table and she took several minutes to recover. Patient's past medical, surgical, social and/or family history reviewed, updated in chart, and are non-contributory (unless otherwise stated). Medications and allergies also reviewed and updated in chart.     Review of Systems:  Constitutional:  No fever, + fatigue, no chills, + headaches, no weight change  Dermatology:  No rash, no mole, no dry or sensitive skin  ENT:  No cough, no sore throat, no sinus pain, no runny nose, no ear pain  Cardiology:  + chest pain, + palpitations, no leg edema, + shortness of breath, no PND  Gastroenterology:  No dysphagia, no abdominal pain, no nausea, no vomiting, no constipation, no diarrhea, no heartburn  Musculoskeletal:  No joint pain, no leg cramps, no back pain, no muscle aches  Respiratory:+ shortness of breath, no orthopnea, no wheezing, no HOUSER, no hemoptysis  Urology:  No blood in the urine, no urinary frequency, no urinary incontinence, no urinary urgency, no nocturia, no dysuria    Vitals:    05/11/22 1203 05/11/22 1230 05/11/22 1240 05/11/22 1250   BP: 110/80 100/70 100/70 88/70   Position:  Supine Sitting Standing   Pulse: 115 94 111 132   Resp: 18      Temp: 97.3 °F (36.3 °C)      SpO2: 98%      Weight: 126 lb (57.2 kg)      Height: 5' 7\" (1.702 m)          Physical Exam  Constitutional:       General: She is not in acute distress. Appearance: She is well-developed. She is ill-appearing. HENT:      Head: Normocephalic and atraumatic. Right Ear: External ear normal.      Left Ear: External ear normal.      Nose: Nose normal.   Eyes:      General: No scleral icterus. Conjunctiva/sclera: Conjunctivae normal.      Pupils: Pupils are equal, round, and reactive to light. Neck:      Thyroid: No thyromegaly. Cardiovascular:      Rate and Rhythm: Regular rhythm. Tachycardia present. Heart sounds: Normal heart sounds. No murmur heard. Pulmonary:      Effort: Pulmonary effort is normal. No accessory muscle usage or respiratory distress. Breath sounds: Normal breath sounds. No wheezing. Musculoskeletal:         General: Normal range of motion. Cervical back: Normal range of motion and neck supple. Right lower leg: No edema. Left lower leg: No edema. Skin:     General: Skin is warm and dry. Findings: No rash. Neurological:      Mental Status: She is alert and oriented to person, place, and time. Deep Tendon Reflexes: Reflexes are normal and symmetric. Psychiatric:         Speech: Speech normal.         Behavior: Behavior normal.         Assessment/Plan:      Betsy was seen today for palpitations and shortness of breath. Diagnoses and all orders for this visit:    Syncope, unspecified syncope type    Tachycardia  -     TSH; Future  -     T4, Free; Future  -     Magnesium; Future    Dizziness on standing    Shortness of breath    patient was sent to the ER via EMS as she was near syncopal when we were checking orthostatics. She is unable to drive. As above. Call or go to ED immediately if symptoms worsen or persist.  Return if symptoms worsen or fail to improve. , or sooner if necessary. Educational materials and/or home exercises printed for patient's review and were included in patient instructions on his/her After Visit Summary and given to patient at the end of visit. Counseled regarding above diagnosis, including possible risks and complications,  especially if left uncontrolled. Counseled regarding the possible side effects, risks, benefits and alternatives to treatment; patient and/or guardian verbalizes understanding, agrees, feels comfortable with and wishes to proceed with above treatment plan. Advised patient to call with any new medication issues, and read all Rx info from pharmacy to assure aware of all possible risks and side effects of medication before taking. Reviewed age and gender appropriate health screening exams and vaccinations. Advised patient regarding importance of keeping up with recommended health maintenance and to schedule as soon as possible if overdue, as this is important in assessing for undiagnosed pathology, especially cancer, as well as protecting against potentially harmful/life threatening disease. Patient and/or guardian verbalizes understanding and agrees with above counseling, assessment and plan. All questions answered. Estrellita Sanchez PA-C  5/11/2022    I have personally reviewed and updated the chief complaint, HPI, Past Medical, Family and Social History, as well as the above Review of Systems.

## 2022-05-11 NOTE — PROGRESS NOTES
Database initiated Patient is A&O independent from home.  Uses no assistive devices and is RA at baseline

## 2022-05-11 NOTE — H&P
[facility]   History and Physical      CHIEF COMPLAINT:    Near syncopal episode    History of Present Illness:  31 y.o. female who presents from doctors office for  evaluation after she had a near syncopal episode during orthostatic vitals. She reports on going dizziness and tachycardia for the past 3 weeks with associated syncopal episodes when she stands up for the past week. She endorses complete LOC. She also reports feeling weak and sob. Sometimes she can avoid syncope by sitting down, but she remains dizzy. Patient was seen in the [facility] ER 05/10/22 for palpitations, dizziness, and syncope but eloped from triage area before complete evaluation performed. She was recently evaluated by neurology for migraine headaches that have been progressively worsening over the past 6 months. She also reports new onset vertigo in the past few months with recent fall from out of bed. Getting up slowly helps to alleviate room spinning sensation. Patient was seen in the ER on 02/22/22 for dizziness and was told she had vertigo. Was prescribed Baclofen but has not taken it. States has recently cut down on caffeine intake. Will have an occasional red bull, coke or other caffeinated drinks. Drinks \" a ton of water\" a day. Informant(s) for H&P: patient     REVIEW OF SYSTEMS:     Denies CP, subjective fever/chills, cough, congestion, n/v/d, ha, vision/hearing changes, wt changes, hot/cold flashes, other open skin lesions, constipation, dysuria/hematuria unless noted in HPI. Complete ROS performed with the patient and is otherwise negative. PMH:  Past Medical History:   Diagnosis Date    Anxiety and depression        Surgical History:  Past Surgical History:   Procedure Laterality Date    CHOLECYSTECTOMY         Medications Prior to Admission:    Prior to Admission medications    Medication Sig Start Date End Date Taking?  Authorizing Provider   baclofen (LIORESAL) 10 MG tablet Take 1 tablet by mouth 2 times daily  Patient not taking: Reported on 5/10/2022 4/13/22 5/13/22  JOSSY Maldonado NP   polyethylene glycol (GOLYTELY) 236 g solution Take 4,000 mLs by mouth once for 1 dose  Patient not taking: Reported on 4/13/2022 7/1/22 7/1/22  JOSSY Henry CNP   dicyclomine (BENTYL) 10 MG capsule Take 1 capsule by mouth 3 times daily  Patient not taking: Reported on 5/10/2022 3/7/22   Chapo DanielsJEYSON   omeprazole (PRILOSEC) 20 MG delayed release capsule Take 1 capsule by mouth 2 times daily (before meals)  Patient not taking: Reported on 5/10/2022 3/7/22   Chapo DanielsJEYSON   vitamin D3 (CHOLECALCIFEROL) 25 MCG (1000 UT) TABS tablet Take 1 tablet by mouth daily 3/1/22   Chapo DanielsJEYSON   escitalopram (LEXAPRO) 20 MG tablet Take 20 mg by mouth daily    Historical Provider, MD   Cyanocobalamin (VITAMIN B 12 PO) Take by mouth  Patient not taking: Reported on 4/13/2022    Historical Provider, MD       Allergies:    Pcn [penicillins]    Social History:    reports that she has never smoked. She has never used smokeless tobacco. She reports previous alcohol use. She reports previous drug use. Family History:   family history includes No Known Problems in her mother.      PHYSICAL EXAM:  Vitals:  /82   Pulse 85   Temp 97.6 °F (36.4 °C)   Resp 15   LMP 05/08/2022   SpO2 100%     General Appearance: alert and oriented to person, place and time and in no acute distress  Skin: warm and dry  Head: normocephalic and atraumatic  Eyes: pupils equal, round, and reactive to light, extraocular eye movements intact, conjunctivae normal  Neck: neck supple and non tender without mass   Pulmonary/Chest: clear to auscultation bilaterally- no wheezes, rales or rhonchi, normal air movement, no respiratory distress  Cardiovascular: normal rate, normal S1 and S2 and no carotid bruits  Abdomen: soft, non-tender, non-distended, normal bowel sounds, no masses or organomegaly  Extremities: no cyanosis, no clubbing and no edema  Neurologic: no cranial nerve deficit and speech normal    LABS:  Recent Labs     05/10/22  1621 05/11/22  1352    135   K 3.8 4.0    102   CO2 23 24   BUN 9 12   CREATININE 0.8 0.8   GLUCOSE 106* 91   CALCIUM 9.4 8.7       Recent Labs     05/10/22  1621 05/11/22  1352   WBC 4.5 2.8*   RBC 4.33 3.94   HGB 13.4 12.4   HCT 39.6 35.7   MCV 91.5 90.6   MCH 30.9 31.5   MCHC 33.8 34.7*   RDW 12.4 12.2    173   MPV 9.6 9.7       No results for input(s): POCGLU in the last 72 hours. I reviewed all labs and diagnostic images        Radiology: No results found. EKG:           ASSESSMENT:      Principal Problem:    Syncope and collapse  Resolved Problems:    * No resolved hospital problems. *      PLAN:    1. Syncopal episode  -consult cardiology  -TSH 2.120    2. Vapes  -counseled on continued cessation    3. Depression-  -symptom control on lexapro    4.  IBS  -follows with GI  -check fecal calprotectin    Code Status:  Full   DVT prophylaxis:  lovenox         Electronically signed by JOSSY Mosqueda CNP on 5/11/2022 at 3:32 PM

## 2022-05-11 NOTE — ED PROVIDER NOTES
HPI     Patient is a 31 y.o. female presents with a chief complaint of syncope  This has been occurring for a 3 weeks. Patient states that it gets better with nothing. Patient states that it gets worse with time. Patient states that it is moderate in severity. Patient states it was gradual in onset. Patient presents with syncope. Patient states she has been having chest pain and syncope every time she stands up. Patient notes that she has had no recent surgery, or stroke or history of blood clots in the past.  Patient was seen by her primary care provider today. Patient denies any fevers or chills. Patient stated that she intermittently has some mild chest tightness. Patient stated that her heart rate into the 130s prior to syncopizing  Review of Systems   Constitutional: Negative for activity change, appetite change, chills, fatigue and fever. HENT: Negative for congestion, drooling and sore throat. Respiratory: Positive for chest tightness. Negative for cough and shortness of breath. Cardiovascular: Negative for chest pain and palpitations. Gastrointestinal: Negative for abdominal distention, abdominal pain, diarrhea, nausea and vomiting. Genitourinary: Negative for decreased urine volume, difficulty urinating, enuresis, flank pain, frequency and hematuria. Musculoskeletal: Negative for arthralgias, back pain and neck stiffness. Skin: Negative for rash and wound. Neurological: Positive for syncope. Negative for dizziness, facial asymmetry, light-headedness and headaches. Psychiatric/Behavioral: Negative for agitation, confusion and decreased concentration. Physical Exam  Vitals and nursing note reviewed. Constitutional:       Appearance: She is well-developed. HENT:      Head: Normocephalic and atraumatic. Eyes:      Conjunctiva/sclera: Conjunctivae normal.   Cardiovascular:      Rate and Rhythm: Normal rate and regular rhythm.       Heart sounds: Normal heart sounds. No murmur heard. Pulmonary:      Effort: Pulmonary effort is normal. No respiratory distress. Breath sounds: Normal breath sounds. No wheezing or rales. Abdominal:      General: Bowel sounds are normal.      Palpations: Abdomen is soft. Tenderness: There is no abdominal tenderness. There is no guarding or rebound. Musculoskeletal:      Cervical back: Normal range of motion and neck supple. Skin:     General: Skin is warm and dry. Neurological:      Mental Status: She is alert and oriented to person, place, and time. Cranial Nerves: No cranial nerve deficit. Coordination: Coordination normal.          Procedures     [facility]     ED Course as of 05/11/22 1531   Wed May 11, 2022   1444 EKG interpreted by me. Rate 80 beatsPR interval.  Incomplete right bundle branch block seen in lead V1 and V2 possible conduction delay. Not obvious for Brugada. [JM]      ED Course User Index  [JM] Juan José Camacho MD      Patient is a 31 y.o. female presenting with syncope and collapse. Patient stated this has been going on for the past few weeks and is gotten progressively worse. Patient was seen by her primary care provider and while getting orthostatics. Patient passed out. Patient was given fluids here. Patient had D-dimer. D-dimer is negative. Patient's EKG was notable for an RSR prime. Patient will be admitted to internal medicine for further work-up concerning her dizziness. Discussed case internal medicine agreed to admit patient. Patient was seen and evaluated by myself and my attending Dr. Kedar Armstrong and Plan discussed with attending provider, please see attestation for final plan of care. This note was done using dictation software and there may be some grammatical errors associated with this. Juan José Camacho MD       ED Course as of 05/11/22 1552   Wed May 11, 2022   1444 EKG interpreted by me.   Rate 80 beatsPR interval.  Incomplete right bundle branch block seen in lead V1 and V2 possible conduction delay. Not obvious for Brugada. [JOE]      ED Course User Index  [JOE] Martin Gerard MD       --------------------------------------------- PAST HISTORY ---------------------------------------------  Past Medical History:  has a past medical history of Anxiety and depression and Migraines. Past Surgical History:  has a past surgical history that includes Cholecystectomy. Social History:  reports that she has never smoked. She has never used smokeless tobacco. She reports previous alcohol use. She reports previous drug use. Family History: family history includes No Known Problems in her mother. The patients home medications have been reviewed.     Allergies: Pcn [penicillins]    -------------------------------------------------- RESULTS -------------------------------------------------    LABS:  Results for orders placed or performed during the hospital encounter of 05/11/22   Comprehensive Metabolic Panel w/ Reflex to MG   Result Value Ref Range    Sodium 135 132 - 146 mmol/L    Potassium reflex Magnesium 4.0 3.5 - 5.0 mmol/L    Chloride 102 98 - 107 mmol/L    CO2 24 22 - 29 mmol/L    Anion Gap 9 7 - 16 mmol/L    Glucose 91 74 - 99 mg/dL    BUN 12 6 - 20 mg/dL    CREATININE 0.8 0.5 - 1.0 mg/dL    GFR Non-African American >60 >=60 mL/min/1.73    GFR African American >60     Calcium 8.7 8.6 - 10.2 mg/dL    Total Protein 7.2 6.4 - 8.3 g/dL    Albumin 4.3 3.5 - 5.2 g/dL    Total Bilirubin 0.7 0.0 - 1.2 mg/dL    Alkaline Phosphatase 43 35 - 104 U/L    ALT 8 0 - 32 U/L    AST 17 0 - 31 U/L   CBC with Auto Differential   Result Value Ref Range    WBC 2.8 (L) 4.5 - 11.5 E9/L    RBC 3.94 3.50 - 5.50 E12/L    Hemoglobin 12.4 11.5 - 15.5 g/dL    Hematocrit 35.7 34.0 - 48.0 %    MCV 90.6 80.0 - 99.9 fL    MCH 31.5 26.0 - 35.0 pg    MCHC 34.7 (H) 32.0 - 34.5 %    RDW 12.2 11.5 - 15.0 fL    Platelets 962 664 - 159 E9/L    MPV 9.7 7.0 - 12.0 fL    Neutrophils % 41.2 (L) 43.0 - 80.0 %    Immature Granulocytes % 0.0 0.0 - 5.0 %    Lymphocytes % 45.0 (H) 20.0 - 42.0 %    Monocytes % 10.3 2.0 - 12.0 %    Eosinophils % 2.8 0.0 - 6.0 %    Basophils % 0.7 0.0 - 2.0 %    Neutrophils Absolute 1.16 (L) 1.80 - 7.30 E9/L    Immature Granulocytes # 0.00 E9/L    Lymphocytes Absolute 1.27 (L) 1.50 - 4.00 E9/L    Monocytes Absolute 0.29 0.10 - 0.95 E9/L    Eosinophils Absolute 0.08 0.05 - 0.50 E9/L    Basophils Absolute 0.02 0.00 - 0.20 E9/L   Troponin   Result Value Ref Range    Troponin, High Sensitivity <6 0 - 9 ng/L   D-Dimer, Quantitative   Result Value Ref Range    D-Dimer, Quant <200 ng/mL DDU   POC Pregnancy Urine   Result Value Ref Range    HCG, Urine, POC Negative Negative    Lot Number [id number]     Positive QC Pass/Fail Pass     Negative QC Pass/Fail Pass        RADIOLOGY:  XR CHEST PORTABLE    (Results Pending)           ------------------------- NURSING NOTES AND VITALS REVIEWED ---------------------------  Date / Time Roomed:  5/11/2022  1:33 PM  ED Bed Assignment:  25/25    The nursing notes within the ED encounter and vital signs as below have been reviewed. Patient Vitals for the past 24 hrs:   BP Temp Pulse Resp SpO2   05/11/22 1515 109/82 -- 83 15 99 %   05/11/22 1342 104/82 97.6 °F (36.4 °C) 85 15 100 %       Oxygen Saturation Interpretation: Normal    ------------------------------------------ PROGRESS NOTES ------------------------------------------  See ED course for reevaluation    Counseling:  I have spoken with the patient and discussed todays results, in addition to providing specific details for the plan of care and counseling regarding the diagnosis and prognosis. Their questions are answered at this time and they are agreeable with the plan of admission.    --------------------------------- ADDITIONAL PROVIDER NOTES ---------------------------------  Consultations:   Spoke with Dr. Tommie Lara. Discussed case. They will admit the patient.   This patient's ED course included: a personal history and physicial examination, re-evaluation prior to disposition, multiple bedside re-evaluations, IV medications, cardiac monitoring and continuous pulse oximetry    This patient has remained hemodynamically stable during their ED course. Diagnosis:  1.  Syncope and collapse        Disposition:  Patient's disposition: Admit to telemetry  Patient's condition is Stable         Medhat Jasso MD  Resident  05/11/22 4926

## 2022-05-11 NOTE — ED NOTES
Department of Emergency Medicine  FIRST PROVIDER ELOPEMENT NOTE                 Independent MLP          5/11/22  2:39 AM EDT    MRN: [id number]    HPI: Ezequiel Mcdonnell is a 31 y.o. female who presents to the ED with the following complaint: Palpitations (patient states intermittent palpitations, dizziness and passing out when standing), Dizziness, and Loss of Consciousness      (Please refer to [address] [place] [address] for pertinent ROS and PE documentation)  Labs:  Results for orders placed or performed during the hospital encounter of 03/11/12   Basic metabolic panel   Result Value Ref Range    Sodium 137 132 - 146 mmol/L    Potassium 3.8 3.5 - 5.0 mmol/L    Chloride 101 98 - 107 mmol/L    CO2 23 22 - 29 mmol/L    Anion Gap 13 7 - 16 mmol/L    Glucose 106 (H) 74 - 99 mg/dL    BUN 9 6 - 20 mg/dL    CREATININE 0.8 0.5 - 1.0 mg/dL    GFR Non-African American >60 >=60 mL/min/1.73    GFR African American >60     Calcium 9.4 8.6 - 10.2 mg/dL   CBC   Result Value Ref Range    WBC 4.5 4.5 - 11.5 E9/L    RBC 4.33 3.50 - 5.50 E12/L    Hemoglobin 13.4 11.5 - 15.5 g/dL    Hematocrit 39.6 34.0 - 48.0 %    MCV 91.5 80.0 - 99.9 fL    MCH 30.9 26.0 - 35.0 pg    MCHC 33.8 32.0 - 34.5 %    RDW 12.4 11.5 - 15.0 fL    Platelets 093 430 - 408 E9/L    MPV 9.6 7.0 - 12.0 fL   Troponin   Result Value Ref Range    Troponin, High Sensitivity <6 0 - 9 ng/L   TSH   Result Value Ref Range    TSH 2.120 0.270 - 4.200 uIU/mL   T4, Free   Result Value Ref Range    T4 Free 1.25 0.93 - 1.70 ng/dL   EKG 12 Lead   Result Value Ref Range    Ventricular Rate 90 BPM    Atrial Rate 90 BPM    P-R Interval 138 ms    QRS Duration 90 ms    Q-T Interval 386 ms    QTc Calculation (Bazett) 472 ms    P Axis 75 degrees    R Axis 150 degrees    T Axis 32 degrees     Imaging: All Radiology results interpreted by Radiologist unless otherwise noted.   No orders to display     ED Course      Medications   0.9 % sodium chloride bolus (has no administration in time range)        -------------------------  Disposition    Patient ELOPED from the department prior to completion of evaluation after triage. Results of triage orders that were completed at time of elopement as indicated above were reviewed.     Diagnosis at Time of Elopement: (Based on presenting complaint/available test results):   Palpitations  Syncope     Electronically signed by YURIY Garrett   DD: 5/11/22     YURIY Garrett  05/11/22 6183

## 2022-05-11 NOTE — Clinical Note
Discharge Plan[de-identified] Other/[facility])   Telemetry/Cardiac Monitoring Required?: Yes

## 2022-05-11 NOTE — ED NOTES
Pt called several times to recheck vital signs and update with no answer. Er dept and er bathroom checked ,pt not present in dept at this time, Rn charge aware.      Talha Bosch LPN  78/51/27 [id number]

## 2022-05-12 LAB
ANION GAP SERPL CALCULATED.3IONS-SCNC: 8 MMOL/L (ref 7–16)
BASOPHILS ABSOLUTE: 0.03 E9/L (ref 0–0.2)
BASOPHILS RELATIVE PERCENT: 1 % (ref 0–2)
BUN BLDV-MCNC: 12 MG/DL (ref 6–20)
CALCIUM SERPL-MCNC: 8.4 MG/DL (ref 8.6–10.2)
CHLORIDE BLD-SCNC: 107 MMOL/L (ref 98–107)
CO2: 24 MMOL/L (ref 22–29)
CREAT SERPL-MCNC: 0.8 MG/DL (ref 0.5–1)
EKG ATRIAL RATE: 80 BPM
EKG P AXIS: 74 DEGREES
EKG P-R INTERVAL: 134 MS
EKG Q-T INTERVAL: 410 MS
EKG QRS DURATION: 90 MS
EKG QTC CALCULATION (BAZETT): 472 MS
EKG R AXIS: 94 DEGREES
EKG T AXIS: 38 DEGREES
EKG VENTRICULAR RATE: 80 BPM
EOSINOPHILS ABSOLUTE: 0.06 E9/L (ref 0.05–0.5)
EOSINOPHILS RELATIVE PERCENT: 2.1 % (ref 0–6)
GFR AFRICAN AMERICAN: >60
GFR NON-AFRICAN AMERICAN: >60 ML/MIN/1.73
GLUCOSE BLD-MCNC: 87 MG/DL (ref 74–99)
HCG QUALITATIVE: NEGATIVE
HCT VFR BLD CALC: 36 % (ref 34–48)
HEMOGLOBIN: 12.2 G/DL (ref 11.5–15.5)
IMMATURE GRANULOCYTES #: 0 E9/L
IMMATURE GRANULOCYTES %: 0 % (ref 0–5)
LV EF: 65 %
LVEF MODALITY: NORMAL
LYMPHOCYTES ABSOLUTE: 1.48 E9/L (ref 1.5–4)
LYMPHOCYTES RELATIVE PERCENT: 51.4 % (ref 20–42)
MCH RBC QN AUTO: 31.2 PG (ref 26–35)
MCHC RBC AUTO-ENTMCNC: 33.9 % (ref 32–34.5)
MCV RBC AUTO: 92.1 FL (ref 80–99.9)
MONOCYTES ABSOLUTE: 0.28 E9/L (ref 0.1–0.95)
MONOCYTES RELATIVE PERCENT: 9.7 % (ref 2–12)
NEUTROPHILS ABSOLUTE: 1.03 E9/L (ref 1.8–7.3)
NEUTROPHILS RELATIVE PERCENT: 35.8 % (ref 43–80)
PDW BLD-RTO: 12.2 FL (ref 11.5–15)
PLATELET # BLD: 175 E9/L (ref 130–450)
PMV BLD AUTO: 9.9 FL (ref 7–12)
POTASSIUM REFLEX MAGNESIUM: 4.1 MMOL/L (ref 3.5–5)
RBC # BLD: 3.91 E12/L (ref 3.5–5.5)
SODIUM BLD-SCNC: 139 MMOL/L (ref 132–146)
WBC # BLD: 2.9 E9/L (ref 4.5–11.5)

## 2022-05-12 PROCEDURE — 85025 COMPLETE CBC W/AUTO DIFF WBC: CPT

## 2022-05-12 PROCEDURE — 99204 OFFICE O/P NEW MOD 45 MIN: CPT | Performed by: INTERNAL MEDICINE

## 2022-05-12 PROCEDURE — 96372 THER/PROPH/DIAG INJ SC/IM: CPT

## 2022-05-12 PROCEDURE — 96361 HYDRATE IV INFUSION ADD-ON: CPT

## 2022-05-12 PROCEDURE — APPSS30 APP SPLIT SHARED TIME 16-30 MINUTES: Performed by: NURSE PRACTITIONER

## 2022-05-12 PROCEDURE — 84703 CHORIONIC GONADOTROPIN ASSAY: CPT

## 2022-05-12 PROCEDURE — 93306 TTE W/DOPPLER COMPLETE: CPT

## 2022-05-12 PROCEDURE — G0378 HOSPITAL OBSERVATION PER HR: HCPCS

## 2022-05-12 PROCEDURE — 93010 ELECTROCARDIOGRAM REPORT: CPT | Performed by: INTERNAL MEDICINE

## 2022-05-12 PROCEDURE — 80048 BASIC METABOLIC PNL TOTAL CA: CPT

## 2022-05-12 PROCEDURE — 99225 PR SBSQ OBSERVATION CARE/DAY 25 MINUTES: CPT | Performed by: INTERNAL MEDICINE

## 2022-05-12 PROCEDURE — 6360000002 HC RX W HCPCS: Performed by: NURSE PRACTITIONER

## 2022-05-12 PROCEDURE — 2580000003 HC RX 258: Performed by: NURSE PRACTITIONER

## 2022-05-12 PROCEDURE — 6370000000 HC RX 637 (ALT 250 FOR IP): Performed by: NURSE PRACTITIONER

## 2022-05-12 PROCEDURE — 2580000003 HC RX 258: Performed by: INTERNAL MEDICINE

## 2022-05-12 PROCEDURE — 36415 COLL VENOUS BLD VENIPUNCTURE: CPT

## 2022-05-12 RX ORDER — SODIUM CHLORIDE 9 MG/ML
INJECTION, SOLUTION INTRAVENOUS CONTINUOUS
Status: DISCONTINUED | OUTPATIENT
Start: 2022-05-12 | End: 2022-05-13

## 2022-05-12 RX ADMIN — SODIUM CHLORIDE: 9 INJECTION, SOLUTION INTRAVENOUS at 21:06

## 2022-05-12 RX ADMIN — SODIUM CHLORIDE: 9 INJECTION, SOLUTION INTRAVENOUS at 10:10

## 2022-05-12 RX ADMIN — ACETAMINOPHEN 650 MG: 325 TABLET ORAL at 10:24

## 2022-05-12 RX ADMIN — ESCITALOPRAM OXALATE 20 MG: 10 TABLET ORAL at 08:38

## 2022-05-12 RX ADMIN — ENOXAPARIN SODIUM 40 MG: 100 INJECTION SUBCUTANEOUS at 08:38

## 2022-05-12 RX ADMIN — SODIUM CHLORIDE, PRESERVATIVE FREE 10 ML: 5 INJECTION INTRAVENOUS at 08:39

## 2022-05-12 RX ADMIN — PANTOPRAZOLE SODIUM 40 MG: 40 TABLET, DELAYED RELEASE ORAL at 06:29

## 2022-05-12 ASSESSMENT — PAIN DESCRIPTION - LOCATION
LOCATION: HEAD
LOCATION: HEAD

## 2022-05-12 ASSESSMENT — PAIN SCALES - GENERAL
PAINLEVEL_OUTOF10: 6
PAINLEVEL_OUTOF10: 0
PAINLEVEL_OUTOF10: 0
PAINLEVEL_OUTOF10: 5

## 2022-05-12 ASSESSMENT — PAIN DESCRIPTION - PAIN TYPE: TYPE: CHRONIC PAIN

## 2022-05-12 ASSESSMENT — PAIN DESCRIPTION - DESCRIPTORS
DESCRIPTORS: ACHING
DESCRIPTORS: ACHING

## 2022-05-12 NOTE — CARE COORDINATION
Social Work / Discharge Planning : SW meet with patient and explained role as discharge planner/ transition of care. Patient stated she was sent straight to hospital from her [facility]ock office due to standing up and passing out. Patient stated she was being tested at office for BILLY syndrome. Cardiology on board. Patient is independent from HOME and will return. Patient has PA-MICAH Bullock and insurance. Patient has transportation at discharge. Patient denies any discharge needs. Await plan. SW to follow.  Electronically signed by Lorita Halsted, LSW on 5/12/22 at 10:00 AM EDT

## 2022-05-12 NOTE — PROGRESS NOTES
[facility] Progress Note    Admitting Date and Time: 5/11/2022  1:33 PM  Admit Dx: Syncope and collapse [R55]  Syncopal vertigo [R55, R42]    Subjective:  Patient is being followed for Syncope and collapse [R55]  Syncopal vertigo [R55, R42]     Patient seen sitting up in bed with nurse at bedside. Patient is recieving IV fluids. She is alert oriented in no apparent distress. Patient complains of dizziness, SOB, and lightheadedness after returning from the bathroom. Patient states she has had recent migraines for which she has been following with neurology as an outpatient. Patient states she thinks her head ache today is related to being in the hospitalized. Patient has no other complaints at this time. ROS: denies fever, chills, cp, sob, n/v, HA unless stated above.       escitalopram  20 mg Oral Daily    pantoprazole  40 mg Oral QAM AC    sodium chloride flush  5-40 mL IntraVENous 2 times per day    enoxaparin  40 mg SubCUTAneous Daily     perflutren lipid microspheres, 1.5 mL, ONCE PRN  sodium chloride flush, 5-40 mL, PRN  sodium chloride, , PRN  ondansetron, 4 mg, Q8H PRN   Or  ondansetron, 4 mg, Q6H PRN  polyethylene glycol, 17 g, Daily PRN  acetaminophen, 650 mg, Q6H PRN   Or  acetaminophen, 650 mg, Q6H PRN         Objective:    BP (!) 108/59   Pulse 76   Temp 98.3 °F (36.8 °C) (Oral)   Resp 18   Ht 5' 7\" (1.702 m)   Wt 126 lb (57.2 kg)   LMP 05/08/2022   SpO2 98%   BMI 19.73 kg/m²     General Appearance: alert and oriented to person, place and time and in no acute distress  Skin: warm and dry  Head: normocephalic and atraumatic  Eyes: pupils equal, round, and reactive to light, extraocular eye movements intact, conjunctivae normal  Neck: neck supple and non tender without mass   Pulmonary/Chest: clear to auscultation bilaterally- no wheezes, rales or rhonchi, normal air movement, no respiratory distress  Cardiovascular: normal rate, normal S1 and S2 and no carotid bruits  Abdomen: soft, non-tender, non-distended, normal bowel sounds, no masses or organomegaly  Extremities: no cyanosis, no clubbing and no edema  Neurologic: no cranial nerve deficit and speech normal        Recent Labs     05/10/22  1621 05/11/22  1352 05/12/22  0348    135 139   K 3.8 4.0 4.1    102 107   CO2 23 24 24   BUN 9 12 12   CREATININE 0.8 0.8 0.8   GLUCOSE 106* 91 87   CALCIUM 9.4 8.7 8.4*       Recent Labs     05/10/22  1621 05/11/22  1352 05/12/22  0348   WBC 4.5 2.8* 2.9*   RBC 4.33 3.94 3.91   HGB 13.4 12.4 12.2   HCT 39.6 35.7 36.0   MCV 91.5 90.6 92.1   MCH 30.9 31.5 31.2   MCHC 33.8 34.7* 33.9   RDW 12.4 12.2 12.2    173 175   MPV 9.6 9.7 9.9       Assessment:    Principal Problem:    Syncope and collapse  Active Problems:    Syncopal vertigo    Supraventricular tachycardia (HCC)    History of atypical migraine    Irritable bowel syndrome with both constipation and diarrhea  Resolved Problems:    * No resolved hospital problems. *      Plan:  Syncopal episodes: Complaining of dizziness and tachycardia for the past 3 weeks associated with syncopal episodes when she stands up. She is being worked up outpatient for POTS by PCP, EKG negative, TSH 2.120, T4 1.25, positive orthostatics, echo today, continue IV hydration-Cardiology following  IBS: Could be contributing to dehydration, patient reporting increased diarrhea bowel movements with blood past few weeks, follows with Dr. Maurice Howard of GI, patient being worked up outpatient for colonoscopy. Depression: Continue Lexapro  Migraines: Following with neurology for this issue, CT scan 2/22 for dizziness and headache was negative, continue tylenol for headaches for now. NOTE: This report was transcribed using voice recognition software. Every effort was made to ensure accuracy; however, inadvertent computerized transcription errors may be present.   Electronically signed by Wayland Nissen, APRN - CNP on 5/12/2022 at 10:59 AM

## 2022-05-12 NOTE — CONSULTS
Nutrition Education    · Educated on Nutrition Guidelines for  IBS and Migraine trigger foods  · Learners: Patient    · Readiness: Eager  · Method: Explanation and Handout  · Response: Verbalizes Understanding   · Contact name and number provided.     Nette Sloan RD, CNSC, LD   Contact Number: [phone]

## 2022-05-12 NOTE — PROGRESS NOTES
Call placed to Dr. Estella Billingsley to inform of latest Orthostatic BP, No concerns at this time. Also notified that patient would be a size small with [facility] and item is not available at this time.

## 2022-05-12 NOTE — CONSULTS
CHIEF COMPLAINT: Syncope/Near-syncope    HISTORY OF PRESENT ILLNESS: Patient is a 31 y.o. female seen at the request of Isma Iraheta PA-C and not followed by cardiology. Patient states weeks of progressive symptoms of near-syncope, syncope, lightheadedness, dizziness and palpitations. Some chest pressure symptoms. Some HOUSER. States significant bowel issues. Denies prior cardiac history or risk factors.      Past Medical History:   Diagnosis Date    Anxiety and depression     IBS (irritable bowel syndrome)     Migraines        Patient Active Problem List   Diagnosis    Pancreatitis, unspecified pancreatitis type    Calculus of gallbladder without cholecystitis without obstruction    Acidosis    Thrombocytopenia (HCC)    Elevated LFTs    Syncope and collapse    Syncopal vertigo    Supraventricular tachycardia (HCC)    History of atypical migraine    Irritable bowel syndrome with both constipation and diarrhea       Allergies   Allergen Reactions    Pcn [Penicillins] Swelling     Swelling of the Face       Current Facility-Administered Medications   Medication Dose Route Frequency Provider Last Rate Last Admin    escitalopram (LEXAPRO) tablet 20 mg  20 mg Oral Daily Marcella Palmer APRN - CNP   20 mg at 05/11/22 1737    pantoprazole (PROTONIX) tablet 40 mg  40 mg Oral QAM AC Marcella Palmer APRN - CNP   40 mg at 05/12/22 5946    sodium chloride flush 0.9 % injection 5-40 mL  5-40 mL IntraVENous 2 times per day Marcella Palmer APRN - CNP   10 mL at 05/11/22 1957    sodium chloride flush 0.9 % injection 5-40 mL  5-40 mL IntraVENous PRN Marcella Palmer APRN - CNP        0.9 % sodium chloride infusion   IntraVENous PRN Marcella Palmer APRN - CNP        enoxaparin (LOVENOX) injection 40 mg  40 mg SubCUTAneous Daily JOSSY Mosqueda - CNP   40 mg at 05/11/22 1737    ondansetron (ZOFRAN-ODT) disintegrating tablet 4 mg  4 mg Oral Q8H PRN JOSSY Mosqueda CNP        Or  ondansetron (ZOFRAN) injection 4 mg  4 mg IntraVENous Q6H PRN Page Rings, APRN - CNP        polyethylene glycol (GLYCOLAX) packet 17 g  17 g Oral Daily PRN Page Rings, APRN - CNP        acetaminophen (TYLENOL) tablet 650 mg  650 mg Oral Q6H PRN Page Rings, APRN - CNP   650 mg at 05/11/22 1737    Or    acetaminophen (TYLENOL) suppository 650 mg  650 mg Rectal Q6H PRN Page Rings, APRN - CNP           Social History     Socioeconomic History    Marital status: Single     Spouse name: Not on file    Number of children: Not on file    Years of education: Not on file    Highest education level: Not on file   Occupational History    Not on file   Tobacco Use    Smoking status: Never Smoker    Smokeless tobacco: Never Used   Substance and Sexual Activity    Alcohol use: Not Currently    Drug use: Not Currently    Sexual activity: Not on file   Other Topics Concern    Not on file   Social History Narrative    Not on file     Social Determinants of Health     Financial Resource Strain: Low Risk     Difficulty of Paying Living Expenses: Not very hard   Food Insecurity: No Food Insecurity    Worried About Running Out of Food in the Last Year: Never true    Jesse of Food in the Last Year: Never true   Transportation Needs:     Lack of Transportation (Medical): Not on file    Lack of Transportation (Non-Medical):  Not on file   Physical Activity:     Days of Exercise per Week: Not on file    Minutes of Exercise per Session: Not on file   Stress:     Feeling of Stress : Not on file   Social Connections:     Frequency of Communication with Friends and Family: Not on file    Frequency of Social Gatherings with Friends and Family: Not on file    Attends Bahai Services: Not on file    Active Member of Clubs or Organizations: Not on file    Attends Club or Organization Meetings: Not on file    Marital Status: Not on file   Intimate Partner Violence:     Fear of Current or Ex-Partner: Not on file    Emotionally Abused: Not on file    Physically Abused: Not on file    Sexually Abused: Not on file   Housing Stability:     Unable to Pay for Housing in the Last Year: Not on file    Number of Places Lived in the Last Year: Not on file    Unstable Housing in the Last Year: Not on file       Family History   Problem Relation Age of Onset    No Known Problems Mother      Review of Systems:   Heart: as above   Lungs: as above   Eyes: denies changes in vision or discharge. Ears: denies changes in hearing or pain. Nose: denies epistaxis or masses   Throat: denies sore throat or trouble swallowing. Neuro: denies numbness, tingling, tremors. Skin: denies rashes or itching. : denies hematuria, dysuria   GI: denies vomiting, diarrhea   Psych: denies mood changed, anxiety, depression. all others negative. Physical Exam   /73   Pulse 77   Temp 97.6 °F (36.4 °C) (Oral)   Resp 16   Ht 5' 7\" (1.702 m)   Wt 126 lb (57.2 kg)   LMP 05/08/2022   SpO2 99%   BMI 19.73 kg/m²   Constitutional: Oriented to person, place, and time. Well-developed and well-nourished. No distress. Head: Normocephalic and atraumatic. Eyes: EOM are normal. Pupils are equal, round, and reactive to light. Neck: Normal range of motion. Neck supple. No hepatojugular reflux and no JVD present. Carotid bruit is not present. No tracheal deviation present. No thyromegaly present. Cardiovascular: Normal rate, regular rhythm, normal heart sounds and intact distal pulses. Exam reveals no gallop and no friction rub. No murmur heard. Pulmonary/Chest: Effort normal and breath sounds normal. No respiratory distress. No wheezes. No rales. No tenderness. Abdominal: Soft. Bowel sounds are normal. No distension and no mass. No tenderness. No rebound and no guarding. Musculoskeletal: Normal range of motion. No edema and no tenderness. Lymphadenopathy:   No cervical adenopathy.  No groin adenopathy. Neurological: Alert and oriented to person, place, and time. Skin: Skin is warm and dry. No rash noted. Not diaphoretic. No erythema. Psychiatric: Normal mood and affect. Behavior is normal.     CBC:   Lab Results   Component Value Date    WBC 2.9 05/12/2022    RBC 3.91 05/12/2022    HGB 12.2 05/12/2022    HCT 36.0 05/12/2022    MCV 92.1 05/12/2022    MCH 31.2 05/12/2022    MCHC 33.9 05/12/2022    RDW 12.2 05/12/2022     05/12/2022    MPV 9.9 05/12/2022     BMP:   Lab Results   Component Value Date     05/12/2022    K 4.1 05/12/2022     05/12/2022    CO2 24 05/12/2022    BUN 12 05/12/2022    LABALBU 4.3 05/11/2022    CREATININE 0.8 05/12/2022    CALCIUM 8.4 05/12/2022    GFRAA >60 05/12/2022    LABGLOM >60 05/12/2022     Magnesium:    Lab Results   Component Value Date    MG 1.7 02/17/2021     Cardiac Enzymes:   Lab Results   Component Value Date    TROPHS <6 05/11/2022    TROPHS <6 05/10/2022    TROPHS <6 02/22/2022      PT/INR:  No results found for: PROTIME, INR  TSH:    Lab Results   Component Value Date    TSH 2.120 05/10/2022     Rhythm Strip: normal sinus rhythm. EKG:  normal sinus rhythm, nonspecific ST and T waves changes. ASSESSMENT AND PLAN:  Patient Active Problem List   Diagnosis    Pancreatitis, unspecified pancreatitis type    Calculus of gallbladder without cholecystitis without obstruction    Acidosis    Thrombocytopenia (HCC)    Elevated LFTs    Syncope and collapse    Syncopal vertigo    Supraventricular tachycardia ([address]    History of atypical migraine    Irritable bowel syndrome with both constipation and diarrhea     1. Syncope/Near syncope:     EKG and labs reviewed. Orthos positive. Echo. Monitor while here. Symptoms and findings suggestive of POTS. Hydration. [place] sodium. Compression hose. Exercise. If echo okay then likely start low dose BB and possibly florinef. 2. GI symptoms: Under evaluation.      Poly Burris. Gabe Navarro.   Cardiologist  Cardiology, [facility]

## 2022-05-12 NOTE — PROGRESS NOTES
P Quality Flow/Interdisciplinary Rounds Progress Note        Quality Flow Rounds held on May 12, 2022    Disciplines Attending:  Bedside Nurse,  and     Arthur Camacho was admitted on 5/11/2022  1:33 PM    Anticipated Discharge Date:       Disposition:    Luis Alberto Score:  Luis Alberto Scale Score: 21    Readmission Risk              Risk of Unplanned Readmission:  0           Discussed patient goal for the day, patient clinical progression, and barriers to discharge. The following Goal(s) of the Day/Commitment(s) have been identified:  monitor BP and dizziness, check cardiology consult's plan.       Ammy Valenzuela RN  May 12, 2022

## 2022-05-12 NOTE — PLAN OF CARE
Problem: Pain  Goal: Verbalizes/displays adequate comfort level or baseline comfort level  5/12/2022 1441 by Denver Dame, RN  Outcome: Progressing  5/12/2022 0125 by Kerry García RN  Outcome: Progressing     Problem: ABCDS Injury Assessment  Goal: Absence of physical injury  5/12/2022 0125 by Kerry García RN  Outcome: Progressing

## 2022-05-13 VITALS
OXYGEN SATURATION: 97 % | WEIGHT: 126 LBS | SYSTOLIC BLOOD PRESSURE: 100 MMHG | HEIGHT: 67 IN | TEMPERATURE: 98.1 F | BODY MASS INDEX: 19.78 KG/M2 | RESPIRATION RATE: 14 BRPM | HEART RATE: 69 BPM | DIASTOLIC BLOOD PRESSURE: 58 MMHG

## 2022-05-13 LAB
ALBUMIN SERPL-MCNC: 3.8 G/DL (ref 3.5–5.2)
ALP BLD-CCNC: 39 U/L (ref 35–104)
ALT SERPL-CCNC: 7 U/L (ref 0–32)
ANION GAP SERPL CALCULATED.3IONS-SCNC: 8 MMOL/L (ref 7–16)
AST SERPL-CCNC: 12 U/L (ref 0–31)
BILIRUB SERPL-MCNC: 0.7 MG/DL (ref 0–1.2)
BUN BLDV-MCNC: 8 MG/DL (ref 6–20)
CALCIUM SERPL-MCNC: 8.3 MG/DL (ref 8.6–10.2)
CHLORIDE BLD-SCNC: 105 MMOL/L (ref 98–107)
CO2: 23 MMOL/L (ref 22–29)
CORTISOL TOTAL: 7.83 MCG/DL (ref 2.68–18.4)
CREAT SERPL-MCNC: 0.7 MG/DL (ref 0.5–1)
GFR AFRICAN AMERICAN: >60
GFR NON-AFRICAN AMERICAN: >60 ML/MIN/1.73
GLUCOSE BLD-MCNC: 88 MG/DL (ref 74–99)
HCT VFR BLD CALC: 32.4 % (ref 34–48)
HEMOGLOBIN: 11 G/DL (ref 11.5–15.5)
MCH RBC QN AUTO: 30.6 PG (ref 26–35)
MCHC RBC AUTO-ENTMCNC: 34 % (ref 32–34.5)
MCV RBC AUTO: 90 FL (ref 80–99.9)
PDW BLD-RTO: 12.1 FL (ref 11.5–15)
PLATELET # BLD: 147 E9/L (ref 130–450)
PMV BLD AUTO: 9.9 FL (ref 7–12)
POTASSIUM SERPL-SCNC: 3.8 MMOL/L (ref 3.5–5)
RBC # BLD: 3.6 E12/L (ref 3.5–5.5)
SODIUM BLD-SCNC: 136 MMOL/L (ref 132–146)
TOTAL PROTEIN: 6.3 G/DL (ref 6.4–8.3)
WBC # BLD: 3.1 E9/L (ref 4.5–11.5)

## 2022-05-13 PROCEDURE — 96361 HYDRATE IV INFUSION ADD-ON: CPT

## 2022-05-13 PROCEDURE — APPSS45 APP SPLIT SHARED TIME 31-45 MINUTES: Performed by: NURSE PRACTITIONER

## 2022-05-13 PROCEDURE — 99217 PR OBSERVATION CARE DISCHARGE MANAGEMENT: CPT | Performed by: INTERNAL MEDICINE

## 2022-05-13 PROCEDURE — 85027 COMPLETE CBC AUTOMATED: CPT

## 2022-05-13 PROCEDURE — 99215 OFFICE O/P EST HI 40 MIN: CPT | Performed by: INTERNAL MEDICINE

## 2022-05-13 PROCEDURE — 82533 TOTAL CORTISOL: CPT

## 2022-05-13 PROCEDURE — 36415 COLL VENOUS BLD VENIPUNCTURE: CPT

## 2022-05-13 PROCEDURE — 6370000000 HC RX 637 (ALT 250 FOR IP): Performed by: INTERNAL MEDICINE

## 2022-05-13 PROCEDURE — G0378 HOSPITAL OBSERVATION PER HR: HCPCS

## 2022-05-13 PROCEDURE — 80053 COMPREHEN METABOLIC PANEL: CPT

## 2022-05-13 PROCEDURE — 6370000000 HC RX 637 (ALT 250 FOR IP): Performed by: NURSE PRACTITIONER

## 2022-05-13 RX ORDER — FLUDROCORTISONE ACETATE 0.1 MG/1
0.1 TABLET ORAL DAILY
Status: DISCONTINUED | OUTPATIENT
Start: 2022-05-13 | End: 2022-05-13 | Stop reason: HOSPADM

## 2022-05-13 RX ORDER — METOPROLOL SUCCINATE 25 MG/1
12.5 TABLET, EXTENDED RELEASE ORAL DAILY
Status: DISCONTINUED | OUTPATIENT
Start: 2022-05-13 | End: 2022-05-13

## 2022-05-13 RX ORDER — FLUDROCORTISONE ACETATE 0.1 MG/1
0.1 TABLET ORAL DAILY
Qty: 30 TABLET | Refills: 0 | Status: SHIPPED | OUTPATIENT
Start: 2022-05-14 | End: 2022-06-13

## 2022-05-13 RX ORDER — METOPROLOL SUCCINATE 25 MG/1
12.5 TABLET, EXTENDED RELEASE ORAL EVERY EVENING
Status: DISCONTINUED | OUTPATIENT
Start: 2022-05-14 | End: 2022-05-13 | Stop reason: HOSPADM

## 2022-05-13 RX ORDER — METOPROLOL SUCCINATE 25 MG/1
12.5 TABLET, EXTENDED RELEASE ORAL EVERY EVENING
Qty: 30 TABLET | Refills: 3 | Status: SHIPPED | OUTPATIENT
Start: 2022-05-14 | End: 2022-06-27 | Stop reason: SDUPTHER

## 2022-05-13 RX ORDER — OMEPRAZOLE 20 MG/1
20 CAPSULE, DELAYED RELEASE ORAL
Qty: 60 CAPSULE | Refills: 1 | Status: SHIPPED | OUTPATIENT
Start: 2022-05-13

## 2022-05-13 RX ADMIN — FLUDROCORTISONE ACETATE 0.1 MG: 0.1 TABLET ORAL at 09:50

## 2022-05-13 RX ADMIN — ESCITALOPRAM OXALATE 20 MG: 10 TABLET ORAL at 09:50

## 2022-05-13 RX ADMIN — PANTOPRAZOLE SODIUM 40 MG: 40 TABLET, DELAYED RELEASE ORAL at 06:14

## 2022-05-13 RX ADMIN — METOPROLOL SUCCINATE 12.5 MG: 25 TABLET, EXTENDED RELEASE ORAL at 09:50

## 2022-05-13 ASSESSMENT — PAIN SCALES - GENERAL
PAINLEVEL_OUTOF10: 0
PAINLEVEL_OUTOF10: 0

## 2022-05-13 NOTE — PROGRESS NOTES
P Quality Flow/Interdisciplinary Rounds Progress Note        Quality Flow Rounds held on May 13, 2022    Disciplines Attending:  Bedside Nurse, ,  and Nursing Unit Leadership    Lani Valente was admitted on 5/11/2022  1:33 PM    Anticipated Discharge Date:       Disposition:    Luis Alberto Score:  Luis Alberto Scale Score: 21    Readmission Risk              Risk of Unplanned Readmission:  0           Discussed patient goal for the day, patient clinical progression, and barriers to discharge.   The following Goal(s) of the Day/Commitment(s) have been identified:  Monitor BP/dizziness      Wendy Mckenna RN  May 13, 2022

## 2022-05-13 NOTE — PROGRESS NOTES
CHIEF COMPLAINT: Syncope/Near-syncope    HISTORY OF PRESENT ILLNESS: Patient is a 31 y.o. female seen at the request of Jose Roberto Kong PA-C and not followed by cardiology. Patient states weeks of progressive symptoms of near-syncope, syncope, lightheadedness, dizziness and palpitations. Some chest pressure symptoms. Some HOUSER. States significant bowel issues. Denies prior cardiac history or risk factors.      Past Medical History:   Diagnosis Date    Anxiety and depression     IBS (irritable bowel syndrome)     Migraines        Patient Active Problem List   Diagnosis    Pancreatitis, unspecified pancreatitis type    Calculus of gallbladder without cholecystitis without obstruction    Acidosis    Thrombocytopenia (HCC)    Elevated LFTs    Syncope and collapse    Syncopal vertigo    Supraventricular tachycardia ([address]    History of atypical migraine    Irritable bowel syndrome with both constipation and diarrhea       Allergies   Allergen Reactions    Pcn [Penicillins] Swelling     Swelling of the Face       Current Facility-Administered Medications   Medication Dose Route Frequency Provider Last Rate Last Admin    perflutren lipid microspheres (DEFINITY) injection 1.65 mg  1.5 mL IntraVENous ONCE PRN Rona Rojo DO        0.9 % sodium chloride infusion   IntraVENous Continuous Scotty Landau, MD 75 mL/hr at 05/12/22 2106 New Bag at 05/12/22 2106    escitalopram (LEXAPRO) tablet 20 mg  20 mg Oral Daily Norina Lesch, APRN - CNP   20 mg at 05/12/22 0838    pantoprazole (PROTONIX) tablet 40 mg  40 mg Oral QAM AC Norina Lesch, APRN - CNP   40 mg at 05/13/22 0171    sodium chloride flush 0.9 % injection 5-40 mL  5-40 mL IntraVENous 2 times per day Norina Lesch, APRN - CNP   10 mL at 05/12/22 0839    sodium chloride flush 0.9 % injection 5-40 mL  5-40 mL IntraVENous PRN Norina Lesch, APRN - CNP        0.9 % sodium chloride infusion   IntraVENous PRN Norina Lesch, APRN - CNP        enoxaparin (LOVENOX) injection 40 mg  40 mg SubCUTAneous Daily JOSSY Palacios - CNP   40 mg at 05/12/22 0439    ondansetron (ZOFRAN-ODT) disintegrating tablet 4 mg  4 mg Oral Q8H PRN JOSSY Palacios - CNP        Or    ondansetron [facility]) injection 4 mg  4 mg IntraVENous Q6H PRN Alonzo Haider, JOSSY - [place]        polyethylene glycol (GLYCOLAX) packet 17 g  17 g Oral Daily PRN Alonzo Haider, APRN - CNP        acetaminophen (TYLENOL) tablet 650 mg  650 mg Oral Q6H PRN Alonzo Haider, APRN - CNP   650 mg at 05/12/22 1024    Or    acetaminophen (TYLENOL) suppository 650 mg  650 mg Rectal Q6H PRN Alonzo Haider, JOSSY - CNP           Social History     Socioeconomic History    Marital status: Single     Spouse name: Not on file    Number of children: Not on file    Years of education: Not on file    Highest education level: Not on file   Occupational History    Not on file   Tobacco Use    Smoking status: Never Smoker    Smokeless tobacco: Never Used   Substance and Sexual Activity    Alcohol use: Not Currently    Drug use: Not Currently    Sexual activity: Not on file   Other Topics Concern    Not on file   Social History Narrative    Not on file     Social Determinants of Health     Financial Resource Strain: Low Risk     Difficulty of Paying Living Expenses: Not very hard   Food Insecurity: No Food Insecurity    Worried About Running Out of Food in the Last Year: Never true    Jesse of Food in the Last Year: Never true   Transportation Needs:     Lack of Transportation (Medical): Not on file    Lack of Transportation (Non-Medical):  Not on file   Physical Activity:     Days of Exercise per Week: Not on file    Minutes of Exercise per Session: Not on file   Stress:     Feeling of Stress : Not on file   Social Connections:     Frequency of Communication with Friends and Family: Not on file    Frequency of Social Gatherings with Friends and Family: Not on file    Attends Hinduism Services: Not on file    Active Member of Clubs or Organizations: Not on file    Attends Club or Organization Meetings: Not on file    Marital Status: Not on file   Intimate Partner Violence:     Fear of Current or Ex-Partner: Not on file    Emotionally Abused: Not on file    Physically Abused: Not on file    Sexually Abused: Not on file   Housing Stability:     Unable to Pay for Housing in the Last Year: Not on file    Number of [place] in the Last Year: Not on file    Unstable Housing in the Last Year: Not on file       Family History   Problem Relation Age of Onset    No Known Problems Mother      Review of Systems:   Heart: as above   Lungs: as above   Eyes: denies changes in vision or discharge. Ears: denies changes in hearing or pain. Nose: denies epistaxis or masses   Throat: denies sore throat or trouble swallowing. Neuro: denies numbness, tingling, tremors. Skin: denies rashes or itching. : denies hematuria, dysuria   GI: denies vomiting, diarrhea   Psych: denies mood changed, anxiety, depression. all others negative. Physical Exam   /67   Pulse 75   Temp 97.8 °F (36.6 °C) (Oral)   Resp 16   Ht 5' 7\" (1.702 m)   Wt 126 lb (57.2 kg)   LMP 05/08/2022   SpO2 97%   BMI 19.73 kg/m²   Constitutional: Oriented to person, place, and time. Well-developed and well-nourished. No distress. Head: Normocephalic and atraumatic. Eyes: EOM are normal. Pupils are equal, round, and reactive to light. Neck: Normal range of motion. Neck supple. No hepatojugular reflux and no JVD present. Carotid bruit is not present. No tracheal deviation present. No thyromegaly present. Cardiovascular: Normal rate, regular rhythm, normal heart sounds and intact distal pulses. Exam reveals no gallop and no friction rub. No murmur heard. Pulmonary/Chest: Effort normal and breath sounds normal. No respiratory distress. No wheezes. No rales. No tenderness. Abdominal: Soft. Bowel sounds are normal. No distension and no mass. No tenderness. No rebound and no guarding. Musculoskeletal: Normal range of motion. No edema and no tenderness. Lymphadenopathy:   No cervical adenopathy. No groin adenopathy. Neurological: Alert and oriented to person, place, and time. Skin: Skin is warm and dry. No rash noted. Not diaphoretic. No erythema. Psychiatric: Normal mood and affect. Behavior is normal.     CBC:   Lab Results   Component Value Date    WBC 3.1 05/13/2022    RBC 3.60 05/13/2022    HGB 11.0 05/13/2022    HCT 32.4 05/13/2022    MCV 90.0 05/13/2022    MCH 30.6 05/13/2022    MCHC 34.0 05/13/2022    RDW 12.1 05/13/2022     05/13/2022    MPV 9.9 05/13/2022     BMP:   Lab Results   Component Value Date     05/13/2022    K 3.8 05/13/2022    K 4.1 05/12/2022     05/13/2022    CO2 23 05/13/2022    BUN 8 05/13/2022    LABALBU 3.8 05/13/2022    CREATININE 0.7 05/13/2022    CALCIUM 8.3 05/13/2022    GFRAA >60 05/13/2022    LABGLOM >60 05/13/2022     Magnesium:    Lab Results   Component Value Date    MG 1.7 02/17/2021     Cardiac Enzymes:   Lab Results   Component Value Date    TROPHS <6 05/11/2022    TROPHS <6 05/10/2022    TROPHS <6 02/22/2022      PT/INR:  No results found for: PROTIME, INR  TSH:    Lab Results   Component Value Date    TSH 2.120 05/10/2022     Rhythm Strip: normal sinus rhythm. EKG:  normal sinus rhythm, nonspecific ST and T waves changes. ASSESSMENT AND PLAN:  Patient Active Problem List   Diagnosis    Pancreatitis, unspecified pancreatitis type    Calculus of gallbladder without cholecystitis without obstruction    Acidosis    Thrombocytopenia (HCC)    Elevated LFTs    Syncope and collapse    Syncopal vertigo    Supraventricular tachycardia ([address]    History of atypical migraine    Irritable bowel syndrome with both constipation and diarrhea     1. Syncope/Near syncope:     EKG and labs reviewed. Orthos positive. Echo normal.     Monitor while here. Symptoms and findings suggestive of POTS. Hydration. [place] sodium. Compression hose. Exercise. Trial low dose BB and florinef. If tolerating then likely home later today. 2. GI symptoms: Under evaluation. Patient stable from CV standpoint. Please call if needed. TYVM. Jr Valerio D.O.   Cardiologist  Cardiology, [id number] [facility]

## 2022-05-13 NOTE — PROGRESS NOTES
Spoke to Dr. Brian Easton. Bp and ortho results read back to Dr. Brian Easton. Okay to discharge from cardiology standpoint.

## 2022-05-13 NOTE — DISCHARGE SUMMARY
[facility] Physician Discharge Summary       No follow-up provider specified. Activity level: As tolerated     Dispo: Home    Condition on discharge: Stable   Patient ID:  Ezequiel Mcdonnell  [id number]  15 y.o.  1995    Admit date: 5/11/2022    Discharge date and time:  5/13/2022  4:36 PM    Admission Diagnoses: Principal Problem:    Syncope and collapse  Active Problems:    Syncopal vertigo    Supraventricular tachycardia (HCC)    History of atypical migraine    Irritable bowel syndrome with both constipation and diarrhea  Resolved Problems:    * No resolved hospital problems. *      Discharge Diagnoses: Principal Problem:    Syncope and collapse  Active Problems:    Syncopal vertigo    Supraventricular tachycardia (HCC)    History of atypical migraine    Irritable bowel syndrome with both constipation and diarrhea  Resolved Problems:    * No resolved hospital problems. *      Consults:  IP CONSULT TO CARDIOLOGY  IP CONSULT TO DIETITIAN    Procedures: Cardiac echo    Hospital Course:   Patient Ezequiel Mcdonnell is a 31 y.o. presented with Syncope and collapse [R55]  Syncopal vertigo [R55, R42] Patient presented from doctors office for  evaluation after she had a near syncopal episode during orthostatic vitals. She reports on going dizziness and tachycardia for the past 3 weeks with associated syncopal episodes when she stands up for the past week. She endorses complete LOC. She also reports feeling weak and sob. Sometimes she can avoid syncope by sitting down, but she remains dizzy.  Patient was seen in the [facility] ER 05/10/22 for palpitations, dizziness, and syncope but eloped from triage area before complete evaluation performed. She was recently evaluated by neurology for migraine headaches that have been progressively worsening over the past 6 months. She also reports new onset vertigo in the past few months with recent fall from out of bed.  Getting up slowly helps to alleviate room spinning sensation. Patient was seen in the ER on 02/22/22 for dizziness and was told she had vertigo. Was prescribed Baclofen but has not taken it. States has recently cut down on caffeine intake. Will have an occasional red bull, coke or other caffeinated drinks. Drinks \" a ton of water\" a day. Patient was admitted at this time for further work-up. Her stay patient's labs remained stable. She was noted to have positive orthostatic. Cardiology was consulted and ordered a cardiac echo which presented normal.  Included the patient is having symptoms suggestive of POTS (patient is being worked up for this by her primary care physician). Patient was then started on low-dose beta-blocker and Florinef. Patient was observed throughout the day and seems to be tolerating medication well. She has been up a meeting in the chowdary without issues. Final set of orthostatics and vitals were obtained and reported to cardiologist.  It was determined that patient is deemed stable at this point for discharge and is to follow-up with her primary care provider. Discharge Exam:    General Appearance: alert and oriented to person, place and time and in no acute distress  Skin: warm and dry  Head: normocephalic and atraumatic  Eyes: pupils equal, round, and reactive to light, extraocular eye movements intact, conjunctivae normal  Neck: neck supple and non tender without mass   Pulmonary/Chest: clear to auscultation bilaterally- no wheezes, rales or rhonchi, normal air movement, no respiratory distress  Cardiovascular: normal rate, normal S1 and S2 and no carotid bruits  Abdomen: soft, non-tender, non-distended, normal bowel sounds, no masses or organomegaly  Extremities: no cyanosis, no clubbing and no edema  Neurologic: no cranial nerve deficit and speech normal    No intake/output data recorded. No intake/output data recorded.       LABS:  Recent Labs     05/11/22  1352 05/12/22  0348 05/13/22  0325    139 136   K 4.0 4.1 structure and function. Left Atrium  Left atrial volume index of 24 ml per meters squared BSA. Right Atrium  Normal right atrium. Mitral Valve  Structurally normal mitral valve. No evidence of mitral valve stenosis. Physiologic and/or trace mitral regurgitation is present. Tricuspid Valve  The tricuspid valve appears structurally normal.  Physiologic and/or trace tricuspid regurgitation. Aortic Valve  The aortic valve is trileaflet. No hemodynamically significant aortic  stenosis is present. No evidence of aortic valve regurgitation. Pulmonic Valve  The pulmonic valve was not well visualized. Pericardial Effusion  No evidence for hemodynamically significant pericardial effusion. Aorta  Normal aortic root and ascending aorta. Miscellaneous  The inferior vena cava diameter is normal with normal respiratory  variation. Conclusions   Summary  Ejection fraction is visually estimated at 65%. No regional wall motion abnormalities seen. Normal right ventricle structure and function. Physiologic and/or trace mitral regurgitation is present. No evidence for hemodynamically significant pericardial effusion.    Signature   ----------------------------------------------------------------  Electronically signed by Roz Lemos DO(Interpreting  physician) on 05/12/2022 04:01 PM  ----------------------------------------------------------------  M-Mode/2D Measurements & Calculations   LV Diastolic    LV Systolic Dimension: 1.8   AV Cusp Separation: 1.7 cmLA  Dimension: 3.7  cm                           Dimension: 2.1 cmAO Root  cm              LV Volume Diastolic: 52.2 ml Dimension: 2.8 cm  LV FS:51.4 %    LV Volume Systolic: 74.9 ml  RVOT VTI: 12.1 cm  LV PW           LV EDV/LV EDV Index: 71.2  Diastolic: 0.9  JE/08 UP/K^9KG ESV/LV ESV  cm              Index: 10.4 ml/6ml/ m^2      RV Diastolic Dimension: 2.3  LV PW Systolic: EF Calculated: 23.9 %        cm  1.2 cm          LV Mass Index: 45 l/min*m^2  Septum LA/Aorta: 3.48  Diastolic: 0.6                               Ascending Aorta: 2.7 cm  cm              LVOT: 2.1 cm                 LA volume/Index: 39.5 ml  CO: 5.78 l/min                               /24ml/m^2  CI: 3.48                                     RA Area: 10 cm^2  l/m*m^2  LV Mass: 75.44  g  Doppler Measurements & Calculations   MV Peak E-Wave:    AV Peak Velocity: 1.26 m/s       LVOT Peak Velocity:  0.94 m/s           AV Peak Gradient: 6.36 mmHg      0.99 m/s  MV Peak A-Wave:    AV Mean Velocity: 0.8 m/s        LVOT Mean Velocity:  0.6 m/s            AV Mean Gradient: 3 mmHg         0.66 m/s  MV E/A Ratio: 1.57 AV VTI: 29.4 cm                  LVOT Peak Gradient: 4  MV Peak Gradient:  AV Area (Continuity):2.56 cm^2   mmHgLVOT Mean  3.6 mmHg                                            Gradient: 2 mmHg  MV Mean Gradient:  LVOT VTI: 21.7 cm  1.9 mmHg           IVRT: 87.7 msec  MV Mean Velocity:  0.66 m/s           Pulm. Vein A Reversal  MV Deceleration    Duration:138.4 msec  Time: 178.4 msec   Pulm. Vein D Velocity:0.58       PV Peak Velocity: 0.9  MV P1/2t: 96.8     m/sPulm. Vein A Reversal         m/s  msec               Velocity:0.21 m/s                PV Peak Gradient: 3.22  MVA by PHT:2.27    Pulm. Vein S Velocity: 0.47 m/s  mmHg  cm^2                                                PV Mean Velocity: 0.57  MV Area                                             m/s  (continuity): 2.9                                   PV Mean Gradient: 1.5  cm^2                                                mmHg  MV E' Septal  Velocity: 0.16 m/s  MV E' Lateral  Velocity: 20 m/s  [email or web address] [id number]    XR CHEST PORTABLE    Result Date: 5/11/2022  EXAMINATION: ONE XRAY VIEW OF THE CHEST 5/11/2022 4:09 pm COMPARISON: Chest series from February 22, 2022 HISTORY: ORDERING SYSTEM PROVIDED HISTORY: shortness of breath TECHNOLOGIST PROVIDED HISTORY: Reason for exam:->shortness of breath FINDINGS: Adequate and symmetric aeration of the lungs. There are no formed consolidations, pleural effusions, or pneumothoraces. Trachea and central mainstem bronchi appear clear. The cardiomediastinal silhouette and pulmonary vascularity appear within normal limits. Osseous and thoracic soft tissue structures demonstrate no acute findings. No evidence of active cardiopulmonary pathology. Patient Instructions:      Medication List      START taking these medications    fludrocortisone 0.1 MG tablet  Commonly known as: FLORINEF  Take 1 tablet by mouth daily  Start taking on: May 14, 2022     metoprolol succinate 25 MG extended release tablet  Commonly known as: TOPROL XL  Take 0.5 tablets by mouth every evening  Start taking on:  May 14, 2022        CONTINUE taking these medications    escitalopram 20 MG tablet  Commonly known as: LEXAPRO     omeprazole 20 MG delayed release capsule  Commonly known as: PRILOSEC  Take 1 capsule by mouth 2 times daily (before meals)     vitamin D3 25 MCG (1000 UT) Tabs tablet  Commonly known as: CHOLECALCIFEROL  Take 1 tablet by mouth daily           Where to Get Your Medications      You can get these medications from any pharmacy    Bring a paper prescription for each of these medications  · fludrocortisone 0.1 MG tablet  · metoprolol succinate 25 MG extended release tablet  · omeprazole 20 MG delayed release capsule           Note that more than 30 minutes was spent in preparing discharge papers, discussing discharge with patient, medication review, etc.    Signed:  Electronically signed by JOSSY Batista CNP on 5/13/2022 at 4:36 PM

## 2022-05-13 NOTE — PLAN OF CARE
Problem: Pain  Goal: Verbalizes/displays adequate comfort level or baseline comfort level  5/13/2022 0126 by Alice Orosco RN  Outcome: Progressing  5/12/2022 1441 by Sandee Perdomo RN  Outcome: Progressing     Problem: ABCDS Injury Assessment  Goal: Absence of physical injury  Outcome: Progressing  Flowsheets (Taken 5/13/2022 0124)  Absence of Physical Injury: Implement safety measures based on patient assessment

## 2022-05-17 ENCOUNTER — TELEPHONE (OUTPATIENT)
Dept: CARDIOLOGY CLINIC | Age: 27
End: 2022-05-17

## 2022-05-18 ENCOUNTER — OFFICE VISIT (OUTPATIENT)
Dept: SURGERY | Age: 27
End: 2022-05-18
Payer: COMMERCIAL

## 2022-05-18 VITALS
DIASTOLIC BLOOD PRESSURE: 73 MMHG | HEIGHT: 67 IN | RESPIRATION RATE: 16 BRPM | SYSTOLIC BLOOD PRESSURE: 99 MMHG | WEIGHT: 126 LBS | HEART RATE: 92 BPM | BODY MASS INDEX: 19.78 KG/M2

## 2022-05-18 DIAGNOSIS — K58.2 IRRITABLE BOWEL SYNDROME WITH BOTH CONSTIPATION AND DIARRHEA: Primary | ICD-10-CM

## 2022-05-18 DIAGNOSIS — R10.9 ABDOMINAL PAIN OF MULTIPLE SITES: ICD-10-CM

## 2022-05-18 PROCEDURE — 99204 OFFICE O/P NEW MOD 45 MIN: CPT | Performed by: SURGERY

## 2022-05-18 PROCEDURE — G8420 CALC BMI NORM PARAMETERS: HCPCS | Performed by: SURGERY

## 2022-05-18 PROCEDURE — G8427 DOCREV CUR MEDS BY ELIG CLIN: HCPCS | Performed by: SURGERY

## 2022-05-18 PROCEDURE — 1036F TOBACCO NON-USER: CPT | Performed by: SURGERY

## 2022-05-18 NOTE — PROGRESS NOTES
Patient's Name/Date of Birth: Mars Herrera / 1995    Date: 5/18/2022    PCP: Adalid Orta PA-C    Chief Complaint   Patient presents with    New Patient    Colonoscopy     consult    Rectal Bleeding       HPI:  Patient seen for evaluation of recurrrent abdominal pain, alternate constipation and diarrhea nd abdominal bloating, was diagnosed with IBS and treated accordingly with no improvmnet in her symptoms. No family Hx of colits or Crohn's disease    Patient's medications, allergies, past medical, surgical, social and family histories were reviewed and updated as appropriate. Allergies   Allergen Reactions    Pcn [Penicillins] Swelling     Swelling of the Face       Past Medical History:   Diagnosis Date    Anxiety and depression     IBS (irritable bowel syndrome)     Migraines         Past Surgical History:   Procedure Laterality Date    CHOLECYSTECTOMY          Social History     Tobacco Use    Smoking status: Never Smoker    Smokeless tobacco: Never Used   Substance Use Topics    Alcohol use: Not Currently       Current Outpatient Medications   Medication Sig Dispense Refill    metoprolol succinate (TOPROL XL) 25 MG extended release tablet Take 0.5 tablets by mouth every evening 30 tablet 3    fludrocortisone (FLORINEF) 0.1 MG tablet Take 1 tablet by mouth daily 30 tablet 0    omeprazole (PRILOSEC) 20 MG delayed release capsule Take 1 capsule by mouth 2 times daily (before meals) 60 capsule 1    vitamin D3 (CHOLECALCIFEROL) 25 MCG (1000 UT) TABS tablet Take 1 tablet by mouth daily 30 tablet 5    escitalopram (LEXAPRO) 20 MG tablet Take 20 mg by mouth daily       No current facility-administered medications for this visit.            Review of Systems  Constitutional: negative  Eyes: negative  Ears, nose, mouth, throat, and face: negative  Respiratory: negative  Cardiovascular: negative  Gastrointestinal: negative  Genitourinary:negative  Integument/breast: negative  Hematologic/lymphatic: negative  Musculoskeletal:negative  Neurological: negative  Allergic/Immunologic: negative    Physical exam:  BP 99/73   Pulse 92   Resp 16   Ht 5' 7\" (1.702 m)   Wt 126 lb (57.2 kg)   LMP 05/08/2022   BMI 19.73 kg/m²   General appearance: no acute distress  Head:NCAT, EOMI, PERRLA, conjunctiva pink  Neck: no masses, supple  Lungs: CTABL  Heart: RRR  Abdomen: soft, nondistended, nontender, no guarding, no peritoneal signs, normoactive bowel sounds  Extremities:no edema  Neuro exam: normal  Skin: no lesions, no rashes    Assessment/Plan:  .proceed with colonoscopy  The procedure risks, benfits, possible complications and alternative options where explained to the patient, she understands and agrees to proceed with surgery. No follow-ups on file.     Marely Pang MD      Send copy of H&P to PCP, Romana Dunning, PA-C

## 2022-05-19 ENCOUNTER — TELEPHONE (OUTPATIENT)
Dept: SURGERY | Age: 27
End: 2022-05-19

## 2022-05-19 NOTE — TELEPHONE ENCOUNTER
Prior Authorization Form:      DEMOGRAPHICS:                     Patient Name:  Charlaine Meckel  Patient :  1995            Insurance:  Payor: Eugene Lebron / Plan: Eugene Lebron - [place] PPO / Product Type: *No Product type* /   Insurance ID Number:    Payor/Plan Subscr  Sex Relation Sub. Ins. ID Effective Group Num   1. [address] 1995 Female Self [id number] 22 [id number]                                   [address]   2.  [address] 1995 Female Self [id number] 10/1/20 [place]                                   [address]         DIAGNOSIS & PROCEDURE:                       Procedure/Operation: Colonoscopy           CPT Code: 28475    Diagnosis:  Rectal Bleeding, Constipation    ICD10 Code: K62.5, K59.00    Location:  [facility]    Surgeon:  Dr. Cyrus Pierson INFORMATION:                          Date: 2022    Time: 10:30AM              Anesthesia:  MAC/TIVA                                                       Status:  Outpatient        Special Comments:         Electronically signed by Sanjeev Salguero MA on 2022 at 11:57 AM

## 2022-06-02 RX ORDER — BACLOFEN 10 MG/1
10 TABLET ORAL 2 TIMES DAILY
COMMUNITY

## 2022-06-02 NOTE — PROGRESS NOTES
[facility] PRE-ADMISSION TESTING INSTRUCTIONS      ARRIVAL INSTRUCTIONS:  [x] Parking the day of Surgery is located in the Main Entrance lot. Upon entering the main door make an immediate right to the surgery reception desk. [x] Bring photo ID and insurance card    [] Bring in a copy of Living will or Durable Power of  papers. [x] Please be sure to arrange for responsible adult to provide transportation to and from the hospital    [x] Please arrange for responsible adult to be with you for the 24 hour period post procedure due to having anesthesia    [x] If you awake am of surgery not feeling well or have temperature >100 please call [phone]    GENERAL INSTRUCTIONS:    [x] Nothing by mouth after midnight, including gum, candy, mints or water    [x] You may brush your teeth, but do not swallow any water    [x] Take medications as instructed with 1-2 oz of water    [x] Stop herbal supplements and vitamins 5 days prior to procedure    [x] Follow preop dosing of blood thinners per physician instructions    [] Take 1/2 dose of evening insulin, but no insulin after midnight    [] No oral diabetic medications after midnight    [] If diabetic and have low blood sugar or feel symptomatic, take 1-2oz apple juice only    [] Bring inhalers day of surgery    [] Bring C-PAP/ Bi-Pap day of surgery    [x] Bring urine specimen day of surgery    [x] Shower or bath with soap, lather and rinse well, AM of Surgery, no lotion, powders or creams to surgical site    [x] Follow bowel prep as instructed per surgeon    [x] No tobacco products within 24 hours of surgery     [x] No alcohol or illegal drug use within 24 hours of surgery.     [x] Jewelry, body piercing's, eyeglasses, contact lenses and dentures are not permitted into surgery (bring cases)      [x] Please do not wear any nail polish, make up or hair products on the day of surgery    [x] You can expect a call the business day prior to procedure to notify you if your arrival time changes    [x] If you receive a survey after surgery we would greatly appreciate your comments    [x] Please notify surgeon if you develop any illness between now and time of surgery (cold, cough, sore throat, fever, nausea, vomiting) or any signs of infections  including skin, wounds, and dental.    []  The Outpatient Pharmacy is available to fill your prescription here on your day of surgery, ask your preop nurse for details

## 2022-06-06 ENCOUNTER — ANESTHESIA EVENT (OUTPATIENT)
Dept: ENDOSCOPY | Age: 27
End: 2022-06-06
Payer: COMMERCIAL

## 2022-06-06 ENCOUNTER — HOSPITAL ENCOUNTER (OUTPATIENT)
Age: 27
Setting detail: OUTPATIENT SURGERY
Discharge: HOME | End: 2022-06-06
Attending: SURGERY | Admitting: SURGERY
Payer: COMMERCIAL

## 2022-06-06 ENCOUNTER — ANESTHESIA (OUTPATIENT)
Dept: ENDOSCOPY | Age: 27
End: 2022-06-06
Payer: COMMERCIAL

## 2022-06-06 VITALS
RESPIRATION RATE: 16 BRPM | WEIGHT: 125 LBS | TEMPERATURE: 97.8 F | BODY MASS INDEX: 20.09 KG/M2 | HEIGHT: 66 IN | SYSTOLIC BLOOD PRESSURE: 99 MMHG | HEART RATE: 67 BPM | OXYGEN SATURATION: 97 % | DIASTOLIC BLOOD PRESSURE: 62 MMHG

## 2022-06-06 LAB
HCG, URINE, POC: NEGATIVE
Lab: NORMAL
METER GLUCOSE: 94 MG/DL (ref 74–99)
NEGATIVE QC PASS/FAIL: NORMAL
POSITIVE QC PASS/FAIL: NORMAL

## 2022-06-06 PROCEDURE — 7100000010 HC PHASE II RECOVERY - FIRST 15 MIN: Performed by: SURGERY

## 2022-06-06 PROCEDURE — 7100000011 HC PHASE II RECOVERY - ADDTL 15 MIN: Performed by: SURGERY

## 2022-06-06 PROCEDURE — 88305 TISSUE EXAM BY PATHOLOGIST: CPT

## 2022-06-06 PROCEDURE — 6360000002 HC RX W HCPCS: Performed by: ANESTHESIOLOGIST ASSISTANT

## 2022-06-06 PROCEDURE — 3700000001 HC ADD 15 MINUTES (ANESTHESIA): Performed by: SURGERY

## 2022-06-06 PROCEDURE — 88305 TISSUE EXAM BY PATHOLOGIST: CPT | Performed by: ANESTHESIOLOGIST ASSISTANT

## 2022-06-06 PROCEDURE — 2709999900 HC NON-CHARGEABLE SUPPLY: Performed by: SURGERY

## 2022-06-06 PROCEDURE — 45380 COLONOSCOPY AND BIOPSY: CPT | Performed by: SURGERY

## 2022-06-06 PROCEDURE — 2580000003 HC RX 258: Performed by: SURGERY

## 2022-06-06 PROCEDURE — 82962 GLUCOSE BLOOD TEST: CPT

## 2022-06-06 PROCEDURE — 3609010300 HC COLONOSCOPY W/BIOPSY SINGLE/MULTIPLE: Performed by: SURGERY

## 2022-06-06 PROCEDURE — 3700000000 HC ANESTHESIA ATTENDED CARE: Performed by: SURGERY

## 2022-06-06 RX ORDER — PROPOFOL 10 MG/ML
INJECTION, EMULSION INTRAVENOUS PRN
Status: DISCONTINUED | OUTPATIENT
Start: 2022-06-06 | End: 2022-06-06 | Stop reason: SDUPTHER

## 2022-06-06 RX ORDER — SODIUM CHLORIDE 9 MG/ML
25 INJECTION, SOLUTION INTRAVENOUS PRN
Status: DISCONTINUED | OUTPATIENT
Start: 2022-06-06 | End: 2022-06-06 | Stop reason: HOSPADM

## 2022-06-06 RX ORDER — FENTANYL CITRATE 50 UG/ML
INJECTION, SOLUTION INTRAMUSCULAR; INTRAVENOUS PRN
Status: DISCONTINUED | OUTPATIENT
Start: 2022-06-06 | End: 2022-06-06 | Stop reason: SDUPTHER

## 2022-06-06 RX ORDER — MIDAZOLAM HYDROCHLORIDE 1 MG/ML
INJECTION INTRAMUSCULAR; INTRAVENOUS PRN
Status: DISCONTINUED | OUTPATIENT
Start: 2022-06-06 | End: 2022-06-06 | Stop reason: SDUPTHER

## 2022-06-06 RX ORDER — SODIUM CHLORIDE 0.9 % (FLUSH) 0.9 %
5-40 SYRINGE (ML) INJECTION EVERY 12 HOURS SCHEDULED
Status: DISCONTINUED | OUTPATIENT
Start: 2022-06-06 | End: 2022-06-06 | Stop reason: HOSPADM

## 2022-06-06 RX ORDER — SODIUM CHLORIDE 0.9 % (FLUSH) 0.9 %
5-40 SYRINGE (ML) INJECTION PRN
Status: DISCONTINUED | OUTPATIENT
Start: 2022-06-06 | End: 2022-06-06 | Stop reason: HOSPADM

## 2022-06-06 RX ORDER — SODIUM CHLORIDE 9 MG/ML
INJECTION, SOLUTION INTRAVENOUS CONTINUOUS
Status: DISCONTINUED | OUTPATIENT
Start: 2022-06-06 | End: 2022-06-06 | Stop reason: HOSPADM

## 2022-06-06 RX ADMIN — PROPOFOL 60 MG: 10 INJECTION, EMULSION INTRAVENOUS at 10:32

## 2022-06-06 RX ADMIN — FENTANYL CITRATE 50 MCG: 50 INJECTION, SOLUTION INTRAMUSCULAR; INTRAVENOUS at 10:23

## 2022-06-06 RX ADMIN — MIDAZOLAM 1 MG: 1 INJECTION INTRAMUSCULAR; INTRAVENOUS at 10:29

## 2022-06-06 RX ADMIN — PROPOFOL 60 MG: 10 INJECTION, EMULSION INTRAVENOUS at 10:29

## 2022-06-06 RX ADMIN — SODIUM CHLORIDE: 9 INJECTION, SOLUTION INTRAVENOUS at 10:18

## 2022-06-06 ASSESSMENT — LIFESTYLE VARIABLES: SMOKING_STATUS: 1

## 2022-06-06 ASSESSMENT — ENCOUNTER SYMPTOMS: SHORTNESS OF BREATH: 0

## 2022-06-06 ASSESSMENT — PAIN - FUNCTIONAL ASSESSMENT: PAIN_FUNCTIONAL_ASSESSMENT: NONE - DENIES PAIN

## 2022-06-06 NOTE — ANESTHESIA PRE PROCEDURE
Department of Anesthesiology  Preprocedure Note       Name:  Alverta Skiff   Age:  31 y.o.  :  1995                                          MRN:  [id number]         Date:  2022      Surgeon: Rich Worrell):  Ginny Quesada MD    Procedure: Procedure(s):  COLONOSCOPY DIAGNOSTIC    Medications prior to admission:   Prior to Admission medications    Medication Sig Start Date End Date Taking? Authorizing Provider   baclofen (LIORESAL) 10 MG tablet Take 10 mg by mouth 2 times daily Indications: as needed for migraines   Yes Historical Provider, MD   metoprolol succinate (TOPROL XL) 25 MG extended release tablet Take 0.5 tablets by mouth every evening 22   [place] Nissen, APRN - CNP   fludrocortisone (FLORINEF) 0.1 MG tablet Take 1 tablet by mouth daily 22  Wayland Nissen, APRN - CNP   omeprazole (PRILOSEC) 20 MG delayed release capsule Take 1 capsule by mouth 2 times daily (before meals) 22   Wayland Nissen, APRN - CNP   vitamin D3 (CHOLECALCIFEROL) 25 MCG (1000 UT) TABS tablet Take 1 tablet by mouth daily 3/1/22   Bonilla Adhikari PA-C   escitalopram (LEXAPRO) 20 MG tablet Take 20 mg by mouth daily    Historical Provider, MD       Current medications:    Current Facility-Administered Medications   Medication Dose Route Frequency Provider Last Rate Last Admin    0.9 % sodium chloride infusion   IntraVENous Continuous Ginny Quesada MD        sodium chloride flush 0.9 % injection 5-40 mL  5-40 mL IntraVENous 2 times per day Ginny Quesada MD        sodium chloride flush 0.9 % injection 5-40 mL  5-40 mL IntraVENous PRN Ginny Quesada MD        0.9 % sodium chloride infusion  25 mL IntraVENous PRN Ginny Quesada MD           Allergies:     Allergies   Allergen Reactions    Pcn [Penicillins] Swelling     Swelling of the Face       Problem List:    Patient Active Problem List   Diagnosis Code    Pancreatitis, unspecified pancreatitis type K85.90    Calculus of gallbladder without cholecystitis without obstruction K80.20    Acidosis E87.2    Thrombocytopenia (HCC) D69.6    Elevated LFTs R79.89    Syncope and collapse R55    Syncopal vertigo R55, R42    Supraventricular tachycardia (HCC) I47.1    History of atypical migraine Z86.69    Irritable bowel syndrome with both constipation and diarrhea K58.2       Past Medical History:        Diagnosis Date    Anxiety and depression     IBS (irritable bowel syndrome)     Migraines     POTS (postural orthostatic tachycardia syndrome) 05/2022    follows w/ Dr Juancho Mary and PCP       Past Surgical History:        Procedure Laterality Date    CHOLECYSTECTOMY         Social History:    Social History     Tobacco Use    Smoking status: Never Smoker    Smokeless tobacco: Never Used   Substance Use Topics    Alcohol use: Not Currently                                Counseling given: Not Answered      Vital Signs (Current):   Vitals:    06/02/22 1503 06/06/22 0948 06/06/22 0951   BP:  128/78    Pulse:  96 98   Resp:  20    Temp:  97.8 °F (36.6 °C)    TempSrc:  Oral    SpO2:  99%    Weight: 125 lb (56.7 kg)     Height: 5' 6\" (1.676 m)                                                BP Readings from Last 3 Encounters:   06/06/22 128/78   05/18/22 99/73   05/13/22 (!) 100/58       NPO Status: Time of last liquid consumption: 2000                        Time of last solid consumption: 1100                        Date of last liquid consumption: 06/05/22                        Date of last solid food consumption: 06/05/22    BMI:   Wt Readings from Last 3 Encounters:   06/02/22 125 lb (56.7 kg)   05/18/22 126 lb (57.2 kg)   05/11/22 126 lb (57.2 kg)     Body mass index is 20.18 kg/m².     CBC:   Lab Results   Component Value Date    WBC 3.1 05/13/2022    RBC 3.60 05/13/2022    HGB 11.0 05/13/2022    HCT 32.4 05/13/2022    MCV 90.0 05/13/2022    RDW 12.1 05/13/2022     05/13/2022       CMP:   Lab Results   Component Value Date     05/13/2022    K 3.8 05/13/2022    K 4.1 05/12/2022     05/13/2022    CO2 23 05/13/2022    BUN 8 05/13/2022    CREATININE 0.7 05/13/2022    GFRAA >60 05/13/2022    LABGLOM >60 05/13/2022    GLUCOSE 88 05/13/2022    PROT 6.3 05/13/2022    CALCIUM 8.3 05/13/2022    BILITOT 0.7 05/13/2022    ALKPHOS 39 05/13/2022    AST 12 05/13/2022    ALT 7 05/13/2022       POC Tests: No results for input(s): POCGLU, POCNA, POCK, POCCL, POCBUN, POCHEMO, POCHCT in the last 72 hours. Coags: No results found for: PROTIME, INR, APTT    HCG (If Applicable):   Lab Results   Component Value Date    PREGTESTUR NEGATIVE 02/13/2021        ABGs: No results found for: PHART, PO2ART, ZSN4HVE, SXT7WQY, BEART, Q7WLBPME     Type & Screen (If Applicable):  No results found for: LABABO, LABRH    Drug/Infectious Status (If Applicable):  No results found for: HIV, HEPCAB    COVID-19 Screening (If Applicable):   Lab Results   Component Value Date    COVID19 Not Detected 02/22/2022           Anesthesia Evaluation  Patient summary reviewed  Airway: Mallampati: II  TM distance: >3 FB   Neck ROM: full  Mouth opening: > = 3 FB   Dental: normal exam         Pulmonary: breath sounds clear to auscultation  (+) current smoker    (-) shortness of breath          Patient did not smoke on day of surgery. Cardiovascular:    (+) dysrhythmias (POTS): SVT,       ECG reviewed  Rhythm: regular  Rate: normal  Echocardiogram reviewed         Beta Blocker:  Dose within 24 Hrs         Neuro/Psych:   (+) headaches: migraine headaches, psychiatric history:depression/anxiety             GI/Hepatic/Renal:   (+) bowel prep,      (-) liver disease and no renal disease       Endo/Other:    (+) blood dyscrasia: thrombocytopenia:., .                 Abdominal:         (-) obese       Vascular: Other Findings:           Anesthesia Plan      MAC     ASA 3       Induction: intravenous. MIPS: Prophylactic antiemetics administered.   Anesthetic plan and risks discussed with patient and spouse. Plan discussed with CRNA.                     Ariel Soto MD   6/6/2022

## 2022-06-06 NOTE — H&P
Claudean Chinchilla, MD   Physician   Specialty:  General Surgery   Progress Notes      Signed   Encounter Date:  5/18/2022                 Signed        Expand AllCollapse All             Patient's Name/Date of Birth: Laura Akhtar / 1995     Date: 5/18/2022     PCP: Toribio Le PA-C          Chief Complaint   Patient presents with    New Patient    Colonoscopy       consult    Rectal Bleeding         HPI:  Patient seen for evaluation of recurrrent abdominal pain, alternate constipation and diarrhea nd abdominal bloating, was diagnosed with IBS and treated accordingly with no improvmnet in her symptoms.  No family Hx of colits or Crohn's disease     Patient's medications, allergies, past medical, surgical, social and family histories were reviewed and updated as appropriate.           Allergies   Allergen Reactions    Pcn [Penicillins] Swelling       Swelling of the Face         Past Medical History        Past Medical History:   Diagnosis Date    Anxiety and depression      IBS (irritable bowel syndrome)      Migraines              Past Surgical History         Past Surgical History:   Procedure Laterality Date    CHOLECYSTECTOMY                Social History           Tobacco Use    Smoking status: Never Smoker    Smokeless tobacco: Never Used   Substance Use Topics    Alcohol use: Not Currently         Current Facility-Administered Medications          Current Outpatient Medications   Medication Sig Dispense Refill    metoprolol succinate (TOPROL XL) 25 MG extended release tablet Take 0.5 tablets by mouth every evening 30 tablet 3    fludrocortisone (FLORINEF) 0.1 MG tablet Take 1 tablet by mouth daily 30 tablet 0    omeprazole (PRILOSEC) 20 MG delayed release capsule Take 1 capsule by mouth 2 times daily (before meals) 60 capsule 1    vitamin D3 (CHOLECALCIFEROL) 25 MCG (1000 UT) TABS tablet Take 1 tablet by mouth daily 30 tablet 5    escitalopram (LEXAPRO) 20 MG tablet Take 20 mg by mouth daily          No current facility-administered medications for this visit.                Review of Systems  Constitutional: negative  Eyes: negative  Ears, nose, mouth, throat, and face: negative  Respiratory: negative  Cardiovascular: negative  Gastrointestinal: negative  Genitourinary:negative  Integument/breast: negative  Hematologic/lymphatic: negative  Musculoskeletal:negative  Neurological: negative  Allergic/Immunologic: negative     Physical exam:  BP 99/73   Pulse 92   Resp 16   Ht 5' 7\" (1.702 m)   Wt 126 lb (57.2 kg)   LMP 05/08/2022   BMI 19.73 kg/m²   General appearance: no acute distress  Head:NCAT, EOMI, PERRLA, conjunctiva pink  Neck: no masses, supple  Lungs: CTABL  Heart: RRR  Abdomen: soft, nondistended, nontender, no guarding, no peritoneal signs, normoactive bowel sounds  Extremities:no edema  Neuro exam: normal  Skin: no lesions, no rashes     Assessment/Plan:  .proceed with colonoscopy  The procedure risks, benfits, possible complications and alternative options where explained to the patient, she understands and agrees to proceed with surgery.     No follow-ups on file.  Ancelmo Petersen MD        Send copy of H&P to PCP, Orquidea Arredondo PA-C                        Office Visit on 5/18/2022           Office Visit on 5/18/2022                Detailed Report            Note shared with patient        Progress Notes Info    Author Note Status Last Update User Last Update Date/Time   Paty Ivey MD Signed Paty Ivey MD 5/18/2022 12:24 PM     Chart Review Routing History    No routing history on file. Patient's History and Physical was reviewed. Patient examined. There has been no change.

## 2022-06-06 NOTE — BRIEF OP NOTE
Brief Postoperative Note      Patient: Rolando Slaughter  YOB: 1995  MRN: [id number]    Date of Procedure: 6/6/2022    Pre-Op Diagnosis: RECTAL BLEEDING CONSTIPATION    Post-Op Diagnosis: Same       Procedure(s):  COLONOSCOPY WITH BIOPSY    Surgeon(s):  Saurabh Laura MD    Assistant:  * No surgical staff found *    Anesthesia: Monitor Anesthesia Care    Estimated Blood Loss (mL): Minimal    Complications: None    Specimens:   ID Type Source Tests Collected by Time Destination   A : bx terminal ileum Tissue Tissue SURGICAL PATHOLOGY Saurabh Laura MD 6/6/2022 1042    B : random colon bx Tissue Colon SURGICAL PATHOLOGY Saurabh Laura MD 6/6/2022 1045        Implants:  * No implants in log *      Drains: * No LDAs found *    Findings:     Electronically signed by Saurabh Laura MD on 6/6/2022 at 10:52 AM

## 2022-06-06 NOTE — OP NOTE
[id number] [facility]                  [address]                                OPERATIVE REPORT    PATIENT NAME: Michel Jasmine                  :        1995  MED REC NO:   [id number]                            ROOM:  ACCOUNT NO:   [de-identified]                           ADMIT DATE: 2022  PROVIDER:     Fernando Chowdhury MD    DATE OF PROCEDURE:  2022    PREOPERATIVE DIAGNOSES:  1. Recurrent diarrhea. 2.  Recurrent constipation. 3.  Recurrent abdominal pain. 4.  Abdominal bloating. POSTOPERATIVE DIAGNOSES:  Normal-appearing colon and terminal ileum. Rule out irritable bowel syndrome. PROCEDURE PERFORMED:  Total colonoscopy to terminal ileum with multiple  random biopsies from the TI and colon. SURGEON:  Fernando Chowdhury M.D. INDICATIONS:  A 79-year-old female patient seen in my office in  consultation because of recurrent abdominal cramping, alternate diarrhea  and constipation, and abdominal bloating. DESCRIPTION OF PROCEDURE:  With the patient in the left lateral position  on the operating table, after adequate sedation was obtained by  Anesthesia, a standard Olympus colonoscope was introduced through the  anal opening and advanced into the rectosigmoid. The anus and rectum  were normal.  Sigmoid colon showed evidence of normal mucosa. There  were no diverticula, no ulcers, and no bleeding. The remainder of the  left colon visualized was normal.  Transverse colon, ascending colon,  and cecum as well as ileocecal valve and appendiceal opening were  reached, visualized, and were normal.  Photos were taken. Terminal  ileum was visualized as well and showed normal appearance. Photos were  taken. Biopsies were taken from the terminal ileum. The scope was  slowly withdrawn. As we withdrew the scope, multiple random biopsies  were taken from the entire colon. The scope was totally removed.   The  patient tolerated the procedure well. IMPRESSION:  1. Normal-appearing colon. 2.  Rule out collagenous colitis. 3.  Rule out IBS. PLAN:  Await biopsy results before making further recommendations. Mariam Miranda MD    D: 06/06/2022 12:09:25       T: 06/06/2022 12:11:47     [id number]  Job#: [id number]     Doc#: [id number]    CC:   MD Ady Payne PA-C [place].

## 2022-06-06 NOTE — ANESTHESIA POSTPROCEDURE EVALUATION
Department of Anesthesiology  Postprocedure Note    Patient: Laura Akhtar  MRN: [id number]  YOB: 1995  Date of evaluation: 6/6/2022  Time:  12:03 PM     Procedure Summary     Date: 06/06/22 Room / Location: [address] [place] [address]    Anesthesia Start: 1018 Anesthesia Stop: 1054    Procedure: COLONOSCOPY WITH BIOPSY (N/A ) Diagnosis: (RECTAL BLEEDING CONSTIPATION)    Surgeons: Claudean Chinchilla, MD Responsible Provider: Loan Ayoub MD    Anesthesia Type: MAC ASA Status: 3          Anesthesia Type: MAC    Fabián Phase I: Fabián Score: 10    Fabián Phase II: Fabián Score: 10    Last vitals: Reviewed and per EMR flowsheets.        Anesthesia Post Evaluation    Patient location during evaluation: PACU  Patient participation: complete - patient participated  Level of consciousness: awake and alert  Airway patency: patent  Nausea & Vomiting: no vomiting and no nausea  Complications: no  Cardiovascular status: hemodynamically stable  Respiratory status: acceptable  Hydration status: stable

## 2022-06-10 ENCOUNTER — TELEPHONE (OUTPATIENT)
Dept: ADMINISTRATIVE | Age: 27
End: 2022-06-10

## 2022-06-10 NOTE — TELEPHONE ENCOUNTER
Patient would like to see Tanmay Ahuja. Said her medication for migraines is not working, and was told to call -in if the meds didn't help. Patient would like a call back. Please call patient and advise. THank you!

## 2022-06-13 NOTE — TELEPHONE ENCOUNTER
Phone call returned but there was no answer so voicemail was left. Waited for return call back has not called back to this line. Will await her phone call back.

## 2022-06-27 ENCOUNTER — OFFICE VISIT (OUTPATIENT)
Dept: CARDIOLOGY CLINIC | Age: 27
End: 2022-06-27
Payer: COMMERCIAL

## 2022-06-27 VITALS
SYSTOLIC BLOOD PRESSURE: 112 MMHG | DIASTOLIC BLOOD PRESSURE: 84 MMHG | BODY MASS INDEX: 20.48 KG/M2 | WEIGHT: 127.4 LBS | RESPIRATION RATE: 18 BRPM | HEART RATE: 83 BPM | HEIGHT: 66 IN

## 2022-06-27 DIAGNOSIS — I47.1 SUPRAVENTRICULAR TACHYCARDIA (HCC): Primary | ICD-10-CM

## 2022-06-27 PROCEDURE — G8420 CALC BMI NORM PARAMETERS: HCPCS | Performed by: INTERNAL MEDICINE

## 2022-06-27 PROCEDURE — G8427 DOCREV CUR MEDS BY ELIG CLIN: HCPCS | Performed by: INTERNAL MEDICINE

## 2022-06-27 PROCEDURE — 99214 OFFICE O/P EST MOD 30 MIN: CPT | Performed by: INTERNAL MEDICINE

## 2022-06-27 PROCEDURE — 1036F TOBACCO NON-USER: CPT | Performed by: INTERNAL MEDICINE

## 2022-06-27 PROCEDURE — 93000 ELECTROCARDIOGRAM COMPLETE: CPT | Performed by: INTERNAL MEDICINE

## 2022-06-27 RX ORDER — METOPROLOL SUCCINATE 25 MG/1
12.5 TABLET, EXTENDED RELEASE ORAL EVERY EVENING
Qty: 90 TABLET | Refills: 3 | Status: SHIPPED | OUTPATIENT
Start: 2022-06-27

## 2022-06-27 RX ORDER — FLUDROCORTISONE ACETATE 0.1 MG/1
0.1 TABLET ORAL DAILY
Qty: 90 TABLET | Refills: 3 | Status: SHIPPED | OUTPATIENT
Start: 2022-06-27 | End: 2022-07-27

## 2022-06-27 NOTE — PROGRESS NOTES
CHIEF COMPLAINT: Syncope/Near-syncope    HISTORY OF PRESENT ILLNESS: Patient is a 31 y.o. female seen in [facility]. Patient states some increased symptoms of late. States significant bowel issues. Denies prior cardiac history or risk factors. Past Medical History:   Diagnosis Date    Anxiety and depression     IBS (irritable bowel syndrome)     Migraines     POTS (postural orthostatic tachycardia syndrome) 05/2022    follows w/ Dr Zandra Ansari and PCP       Patient Active Problem List   Diagnosis    Pancreatitis, unspecified pancreatitis type    Calculus of gallbladder without cholecystitis without obstruction    Acidosis    Thrombocytopenia (HCC)    Elevated LFTs    Syncope and collapse    Syncopal vertigo    Supraventricular tachycardia ([facility] [address]    History of atypical migraine    Irritable bowel syndrome with both constipation and diarrhea    Diarrhea       Allergies   Allergen Reactions    Pcn [Penicillins] Swelling     Swelling of the Face       Current Outpatient Medications   Medication Sig Dispense Refill    fludrocortisone (FLORINEF) 0.1 MG tablet Take 1 tablet by mouth daily 90 tablet 3    metoprolol succinate (TOPROL XL) 25 MG extended release tablet Take 0.5 tablets by mouth every evening 90 tablet 3    baclofen (LIORESAL) 10 MG tablet Take 10 mg by mouth 2 times daily Indications: as needed for migraines      omeprazole (PRILOSEC) 20 MG delayed release capsule Take 1 capsule by mouth 2 times daily (before meals) 60 capsule 1    vitamin D3 (CHOLECALCIFEROL) 25 MCG (1000 UT) TABS tablet Take 1 tablet by mouth daily 30 tablet 5    escitalopram (LEXAPRO) 20 MG tablet Take 20 mg by mouth daily       No current facility-administered medications for this visit.        Social History     Socioeconomic History    Marital status: Single     Spouse name: Not on file    Number of children: Not on file    Years of education: Not on file    Highest education level: Not on file Occupational History    Not on file   Tobacco Use    Smoking status: Never Smoker    Smokeless tobacco: Never Used   Vaping Use    Vaping Use: Some days    Start date: 6/2/2021    Substances: Nicotine   Substance and Sexual Activity    Alcohol use: Not Currently    Drug use: Not Currently    Sexual activity: Not on file   Other Topics Concern    Not on file   Social History Narrative    Not on file     Social Determinants of Health     Financial Resource Strain: Low Risk     Difficulty of Paying Living Expenses: Not very hard   Food Insecurity: No Food Insecurity    Worried About Running Out of Food in the Last Year: Never true    Jesse of Food in the Last Year: Never true   Transportation Needs:     Lack of Transportation (Medical): Not on file    Lack of Transportation (Non-Medical): Not on file   Physical Activity:     Days of Exercise per Week: Not on file    Minutes of Exercise per Session: Not on file   Stress:     Feeling of Stress : Not on file   Social Connections:     Frequency of Communication with Friends and Family: Not on file    Frequency of Social Gatherings with Friends and Family: Not on file    Attends Zoroastrian Services: Not on file    Active Member of [address] [place] [address] or Organizations: Not on file    Attends Club or Organization Meetings: Not on file    Marital Status: Not on file   Intimate Partner Violence:     Fear of Current or Ex-Partner: Not on file    Emotionally Abused: Not on file    Physically Abused: Not on file    Sexually Abused: Not on file   Housing Stability:     Unable to Pay for Housing in the Last Year: Not on file    Number of [place] in the Last Year: Not on file    Unstable Housing in the Last Year: Not on file       Family History   Problem Relation Age of Onset    No Known Problems Mother      Review of Systems:   Heart: as above   Lungs: as above   Eyes: denies changes in vision or discharge. Ears: denies changes in hearing or pain. Nose: denies epistaxis or masses   Throat: denies sore throat or trouble swallowing. Neuro: denies numbness, tingling, tremors. Skin: denies rashes or itching. : denies hematuria, dysuria   GI: denies vomiting, diarrhea   Psych: denies mood changed, anxiety, depression. all others negative. Physical Exam   /84   Pulse 83   Resp 18   Ht 5' 6\" (1.676 m)   Wt 127 lb 6.4 oz (57.8 kg)   BMI 20.56 kg/m²   Constitutional: Oriented to person, place, and time. Well-developed and well-nourished. No distress. Head: Normocephalic and atraumatic. Eyes: EOM are normal. Pupils are equal, round, and reactive to light. Neck: Normal range of motion. Neck supple. No hepatojugular reflux and no JVD present. Carotid bruit is not present. No tracheal deviation present. No thyromegaly present. Cardiovascular: Normal rate, regular rhythm, normal heart sounds and intact distal pulses. Exam reveals no gallop and no friction rub. No murmur heard. Pulmonary/Chest: Effort normal and breath sounds normal. No respiratory distress. No wheezes. No rales. No tenderness. Abdominal: Soft. Bowel sounds are normal. No distension and no mass. No tenderness. No rebound and no guarding. Musculoskeletal: Normal range of motion. No edema and no tenderness. Lymphadenopathy:   No cervical adenopathy. No groin adenopathy. Neurological: Alert and oriented to person, place, and time. Skin: Skin is warm and dry. No rash noted. Not diaphoretic. No erythema. Psychiatric: Normal mood and affect. Behavior is normal.     EKG:  normal sinus rhythm, nonspecific ST and T waves changes.     ASSESSMENT AND PLAN:  Patient Active Problem List   Diagnosis    Pancreatitis, unspecified pancreatitis type    Calculus of gallbladder without cholecystitis without obstruction    Acidosis    Thrombocytopenia (HCC)    Elevated LFTs    Syncope and collapse    Syncopal vertigo    Supraventricular tachycardia ([address]    History of atypical migraine    Irritable bowel syndrome with both constipation and diarrhea    Diarrhea     1. Syncope/Near syncope:     EKG and labs reviewed. Echo normal.     Symptoms and findings suggestive of POTS. Hydration. [place] sodium. Compression hose. Exercise. Low dose BB and florinef. 2. GI symptoms: Per primary. Bishnu Riddle D.O.   Cardiologist  Cardiology, [facility]

## 2022-09-22 NOTE — DISCHARGE INSTR - DIET

## 2023-03-28 ENCOUNTER — OFFICE VISIT (OUTPATIENT)
Dept: PRIMARY CARE CLINIC | Age: 28
End: 2023-03-28
Payer: COMMERCIAL

## 2023-03-28 VITALS
HEIGHT: 66 IN | WEIGHT: 131 LBS | DIASTOLIC BLOOD PRESSURE: 68 MMHG | HEART RATE: 115 BPM | RESPIRATION RATE: 18 BRPM | BODY MASS INDEX: 21.05 KG/M2 | SYSTOLIC BLOOD PRESSURE: 108 MMHG | TEMPERATURE: 97.6 F | OXYGEN SATURATION: 100 %

## 2023-03-28 DIAGNOSIS — K59.00 CONSTIPATION, UNSPECIFIED CONSTIPATION TYPE: ICD-10-CM

## 2023-03-28 DIAGNOSIS — G90.A POTS (POSTURAL ORTHOSTATIC TACHYCARDIA SYNDROME): ICD-10-CM

## 2023-03-28 DIAGNOSIS — K62.5 RECTAL BLEEDING: ICD-10-CM

## 2023-03-28 DIAGNOSIS — K58.2 IRRITABLE BOWEL SYNDROME WITH BOTH CONSTIPATION AND DIARRHEA: ICD-10-CM

## 2023-03-28 DIAGNOSIS — Z00.00 ENCOUNTER FOR WELL ADULT EXAM WITHOUT ABNORMAL FINDINGS: Primary | ICD-10-CM

## 2023-03-28 PROBLEM — R42 SYNCOPAL VERTIGO: Status: RESOLVED | Noted: 2022-05-11 | Resolved: 2023-03-28

## 2023-03-28 PROBLEM — R55 SYNCOPAL VERTIGO: Status: RESOLVED | Noted: 2022-05-11 | Resolved: 2023-03-28

## 2023-03-28 PROBLEM — K85.90 PANCREATITIS, UNSPECIFIED PANCREATITIS TYPE: Status: RESOLVED | Noted: 2021-02-13 | Resolved: 2023-03-28

## 2023-03-28 LAB
ANION GAP SERPL CALCULATED.3IONS-SCNC: 12 MMOL/L (ref 7–16)
BASOPHILS # BLD: 0.02 E9/L (ref 0–0.2)
BASOPHILS NFR BLD: 0.6 % (ref 0–2)
BUN SERPL-MCNC: 11 MG/DL (ref 6–20)
CALCIUM SERPL-MCNC: 9.6 MG/DL (ref 8.6–10.2)
CHLORIDE SERPL-SCNC: 104 MMOL/L (ref 98–107)
CO2 SERPL-SCNC: 24 MMOL/L (ref 22–29)
CREAT SERPL-MCNC: 0.7 MG/DL (ref 0.5–1)
EOSINOPHIL # BLD: 0.04 E9/L (ref 0.05–0.5)
EOSINOPHIL NFR BLD: 1.2 % (ref 0–6)
ERYTHROCYTE [DISTWIDTH] IN BLOOD BY AUTOMATED COUNT: 12.6 FL (ref 11.5–15)
GLUCOSE SERPL-MCNC: 99 MG/DL (ref 74–99)
HCT VFR BLD AUTO: 40.1 % (ref 34–48)
HGB BLD-MCNC: 13.1 G/DL (ref 11.5–15.5)
IMM GRANULOCYTES # BLD: 0 E9/L
IMM GRANULOCYTES NFR BLD: 0 % (ref 0–5)
LYMPHOCYTES # BLD: 1.09 E9/L (ref 1.5–4)
LYMPHOCYTES NFR BLD: 32.3 % (ref 20–42)
MCH RBC QN AUTO: 30.5 PG (ref 26–35)
MCHC RBC AUTO-ENTMCNC: 32.7 % (ref 32–34.5)
MCV RBC AUTO: 93.3 FL (ref 80–99.9)
MONOCYTES # BLD: 0.41 E9/L (ref 0.1–0.95)
MONOCYTES NFR BLD: 12.2 % (ref 2–12)
NEUTROPHILS # BLD: 1.81 E9/L (ref 1.8–7.3)
NEUTS SEG NFR BLD: 53.7 % (ref 43–80)
PLATELET # BLD AUTO: 208 E9/L (ref 130–450)
PMV BLD AUTO: 10.4 FL (ref 7–12)
POTASSIUM SERPL-SCNC: 4 MMOL/L (ref 3.5–5)
RBC # BLD AUTO: 4.3 E12/L (ref 3.5–5.5)
SODIUM SERPL-SCNC: 140 MMOL/L (ref 132–146)
T4 FREE SERPL-MCNC: 1.39 NG/DL (ref 0.93–1.7)
TSH SERPL-MCNC: 1.95 UIU/ML (ref 0.27–4.2)
WBC # BLD: 3.4 E9/L (ref 4.5–11.5)

## 2023-03-28 PROCEDURE — G8484 FLU IMMUNIZE NO ADMIN: HCPCS | Performed by: PHYSICIAN ASSISTANT

## 2023-03-28 PROCEDURE — G8427 DOCREV CUR MEDS BY ELIG CLIN: HCPCS | Performed by: PHYSICIAN ASSISTANT

## 2023-03-28 PROCEDURE — 99213 OFFICE O/P EST LOW 20 MIN: CPT | Performed by: PHYSICIAN ASSISTANT

## 2023-03-28 PROCEDURE — 99395 PREV VISIT EST AGE 18-39: CPT | Performed by: PHYSICIAN ASSISTANT

## 2023-03-28 PROCEDURE — G8420 CALC BMI NORM PARAMETERS: HCPCS | Performed by: PHYSICIAN ASSISTANT

## 2023-03-28 PROCEDURE — 1036F TOBACCO NON-USER: CPT | Performed by: PHYSICIAN ASSISTANT

## 2023-03-28 RX ORDER — SENNA PLUS 8.6 MG/1
1 TABLET ORAL 2 TIMES DAILY
Qty: 60 TABLET | Refills: 1 | Status: SHIPPED | OUTPATIENT
Start: 2023-03-28 | End: 2024-03-27

## 2023-03-28 SDOH — ECONOMIC STABILITY: INCOME INSECURITY: HOW HARD IS IT FOR YOU TO PAY FOR THE VERY BASICS LIKE FOOD, HOUSING, MEDICAL CARE, AND HEATING?: NOT HARD AT ALL

## 2023-03-28 SDOH — ECONOMIC STABILITY: HOUSING INSECURITY
IN THE LAST 12 MONTHS, WAS THERE A TIME WHEN YOU DID NOT HAVE A STEADY PLACE TO SLEEP OR SLEPT IN A SHELTER (INCLUDING NOW)?: NO

## 2023-03-28 SDOH — ECONOMIC STABILITY: FOOD INSECURITY: WITHIN THE PAST 12 MONTHS, YOU WORRIED THAT YOUR FOOD WOULD RUN OUT BEFORE YOU GOT MONEY TO BUY MORE.: NEVER TRUE

## 2023-03-28 SDOH — ECONOMIC STABILITY: FOOD INSECURITY: WITHIN THE PAST 12 MONTHS, THE FOOD YOU BOUGHT JUST DIDN'T LAST AND YOU DIDN'T HAVE MONEY TO GET MORE.: NEVER TRUE

## 2023-03-28 ASSESSMENT — PATIENT HEALTH QUESTIONNAIRE - PHQ9
SUM OF ALL RESPONSES TO PHQ QUESTIONS 1-9: 0
2. FEELING DOWN, DEPRESSED OR HOPELESS: 0
SUM OF ALL RESPONSES TO PHQ QUESTIONS 1-9: 0
SUM OF ALL RESPONSES TO PHQ QUESTIONS 1-9: 0
1. LITTLE INTEREST OR PLEASURE IN DOING THINGS: 0
SUM OF ALL RESPONSES TO PHQ QUESTIONS 1-9: 0
SUM OF ALL RESPONSES TO PHQ9 QUESTIONS 1 & 2: 0

## 2023-03-28 NOTE — PROGRESS NOTES
Screen  02/25/2023    Hepatitis A vaccine  Aged Out    Hib vaccine  Aged Out    Meningococcal (ACWY) vaccine  Aged Out    Pneumococcal 0-64 years Vaccine  Aged Out     Recommendations for [employer] Due: see orders and patient instructions/AVS.    Return in about 4 weeks (around 4/25/2023).

## 2023-03-29 ENCOUNTER — TELEPHONE (OUTPATIENT)
Dept: PRIMARY CARE CLINIC | Age: 28
End: 2023-03-29

## 2023-03-29 NOTE — TELEPHONE ENCOUNTER
----- Message from Patti Law sent at 3/29/2023  9:53 AM EDT -----  Subject: Results Request    QUESTIONS  Results: white blood cell count;  Ordered by: Samm Lundy   Date Performed: 2023-03-29  ---------------------------------------------------------------------------  --------------  Bravo OLIVIA    [phone]; OK to leave message on voicemail  ---------------------------------------------------------------------------  --------------

## 2023-05-01 ENCOUNTER — OFFICE VISIT (OUTPATIENT)
Dept: PRIMARY CARE CLINIC | Age: 28
End: 2023-05-01
Payer: COMMERCIAL

## 2023-05-01 VITALS
OXYGEN SATURATION: 99 % | HEART RATE: 100 BPM | HEIGHT: 66 IN | RESPIRATION RATE: 18 BRPM | DIASTOLIC BLOOD PRESSURE: 60 MMHG | SYSTOLIC BLOOD PRESSURE: 100 MMHG | WEIGHT: 131 LBS | BODY MASS INDEX: 21.05 KG/M2 | TEMPERATURE: 97.9 F

## 2023-05-01 DIAGNOSIS — E55.9 VITAMIN D DEFICIENCY: ICD-10-CM

## 2023-05-01 DIAGNOSIS — D72.819 LEUKOPENIA, UNSPECIFIED TYPE: ICD-10-CM

## 2023-05-01 DIAGNOSIS — D72.819 LEUKOPENIA, UNSPECIFIED TYPE: Primary | ICD-10-CM

## 2023-05-01 DIAGNOSIS — K58.2 IRRITABLE BOWEL SYNDROME WITH BOTH CONSTIPATION AND DIARRHEA: ICD-10-CM

## 2023-05-01 LAB
BASOPHILS # BLD: 0.02 E9/L (ref 0–0.2)
BASOPHILS NFR BLD: 0.3 % (ref 0–2)
EOSINOPHIL # BLD: 0.01 E9/L (ref 0.05–0.5)
EOSINOPHIL NFR BLD: 0.2 % (ref 0–6)
ERYTHROCYTE [DISTWIDTH] IN BLOOD BY AUTOMATED COUNT: 13.4 FL (ref 11.5–15)
HCT VFR BLD AUTO: 38.7 % (ref 34–48)
HGB BLD-MCNC: 12 G/DL (ref 11.5–15.5)
IMM GRANULOCYTES # BLD: 0.02 E9/L
IMM GRANULOCYTES NFR BLD: 0.3 % (ref 0–5)
LYMPHOCYTES # BLD: 0.81 E9/L (ref 1.5–4)
LYMPHOCYTES NFR BLD: 12.8 % (ref 20–42)
MCH RBC QN AUTO: 30.7 PG (ref 26–35)
MCHC RBC AUTO-ENTMCNC: 31 % (ref 32–34.5)
MCV RBC AUTO: 99 FL (ref 80–99.9)
MONOCYTES # BLD: 0.33 E9/L (ref 0.1–0.95)
MONOCYTES NFR BLD: 5.2 % (ref 2–12)
NEUTROPHILS # BLD: 5.13 E9/L (ref 1.8–7.3)
NEUTS SEG NFR BLD: 81.2 % (ref 43–80)
PLATELET # BLD AUTO: 208 E9/L (ref 130–450)
PMV BLD AUTO: 10.4 FL (ref 7–12)
RBC # BLD AUTO: 3.91 E12/L (ref 3.5–5.5)
VITAMIN D 25-HYDROXY: 25 NG/ML (ref 30–100)
WBC # BLD: 6.3 E9/L (ref 4.5–11.5)

## 2023-05-01 PROCEDURE — 1036F TOBACCO NON-USER: CPT | Performed by: PHYSICIAN ASSISTANT

## 2023-05-01 PROCEDURE — 99214 OFFICE O/P EST MOD 30 MIN: CPT | Performed by: PHYSICIAN ASSISTANT

## 2023-05-01 PROCEDURE — G8427 DOCREV CUR MEDS BY ELIG CLIN: HCPCS | Performed by: PHYSICIAN ASSISTANT

## 2023-05-01 PROCEDURE — G8420 CALC BMI NORM PARAMETERS: HCPCS | Performed by: PHYSICIAN ASSISTANT

## 2023-05-01 NOTE — PROGRESS NOTES
Pupils are equal, round, and reactive to light. Neck:      Thyroid: No thyromegaly. Cardiovascular:      Rate and Rhythm: Normal rate and regular rhythm. Heart sounds: Normal heart sounds. No murmur heard. Pulmonary:      Effort: Pulmonary effort is normal. No accessory muscle usage or respiratory distress. Breath sounds: Normal breath sounds. No wheezing. Musculoskeletal:         General: Normal range of motion. Cervical back: Normal range of motion and neck supple. Skin:     General: Skin is warm and dry. Findings: No rash. Neurological:      Mental Status: She is alert and oriented to person, place, and time. Deep Tendon Reflexes: Reflexes are normal and symmetric. Psychiatric:         Mood and Affect: Mood and affect normal.         Speech: Speech normal.         Behavior: Behavior normal.       Assessment/Plan:      Betsy was seen today for constipation. Diagnoses and all orders for this visit:    Leukopenia, unspecified type  -     CBC with Auto Differential; Future    Irritable bowel syndrome with both constipation and diarrhea  -on linzess per GI, has f/u scheduled    Vitamin D deficiency  -     Vitamin D 25 Hydroxy; Future  -taking daily supplement    As above. Call or go to ED immediately if symptoms worsen or persist.  Return if symptoms worsen or fail to improve. , or sooner if necessary. Educational materials and/or home exercises printed for patient's review and were included in patient instructions on his/her After Visit Summary and given to patient at the end of visit. Counseled regarding above diagnosis, including possible risks and complications,  especially if left uncontrolled. Counseled regarding the possible side effects, risks, benefits and alternatives to treatment; patient and/or guardian verbalizes understanding, agrees, feels comfortable with and wishes to proceed with above treatment plan.     Advised patient to call with any new

## 2024-01-31 ENCOUNTER — HOSPITAL ENCOUNTER (OUTPATIENT)
Dept: GENERAL RADIOLOGY | Age: 29
Discharge: HOME | End: 2024-02-02
Payer: COMMERCIAL

## 2024-01-31 VITALS — HEIGHT: 66 IN | BODY MASS INDEX: 18.64 KG/M2 | WEIGHT: 116 LBS

## 2024-01-31 DIAGNOSIS — N63.11 MASS OF UPPER OUTER QUADRANT OF RIGHT BREAST: Primary | ICD-10-CM

## 2024-01-31 DIAGNOSIS — N63.11 MASS OF UPPER OUTER QUADRANT OF RIGHT BREAST: ICD-10-CM

## 2024-01-31 PROCEDURE — 76642 ULTRASOUND BREAST LIMITED: CPT

## 2024-02-07 ENCOUNTER — HOSPITAL ENCOUNTER (OUTPATIENT)
Dept: GENERAL RADIOLOGY | Age: 29
Discharge: HOME | End: 2024-02-09
Payer: COMMERCIAL

## 2024-02-07 DIAGNOSIS — N63.11 MASS OF UPPER OUTER QUADRANT OF RIGHT BREAST: ICD-10-CM

## 2024-02-07 PROCEDURE — 2709999900 US BREAST BIOPSY W LOC DEVICE 1ST LESION RIGHT

## 2024-02-07 NOTE — PROGRESS NOTES
Met with patient prior to her breast biopsy. Instructed on ultrasound guided  breast biopsy procedure.  Instructed that results will be available in approximately 3-5 business days. She confirms that she has an active [facility] MyChart account and is agreeable to discussion of breast biopsy results by phone. Instructed that her physician will also be notified of results.  Provided with folder containing my contact information and post biopsy discharge instructions. Instructed to call me if she has any questions or concerns about her biopsy. Verbalizes understanding. Electronically signed by Aline Dawn RN, BSN on 2/7/2024 at 9:00 AM

## 2024-02-13 ENCOUNTER — TELEPHONE (OUTPATIENT)
Dept: GENERAL RADIOLOGY | Age: 29
End: 2024-02-13

## 2024-02-13 LAB — SURGICAL PATHOLOGY REPORT: NORMAL

## 2024-02-13 NOTE — TELEPHONE ENCOUNTER
Left detailed voicemail message regarding benign breast biopsy findings and performing radiologist's recommendation for annual screening mammogram at age 39 and consideration of breast MRI due to elevated Tyrer Cuzick score.  Breast biopsy results routed to YURIY Glover.  Electronically signed by Aline Dawn RN, BSN on 2/13/2024 at 1:53 PM

## (undated) DEVICE — GRADUATE TRIANG MEASURE 1000ML BLK PRNT

## (undated) DEVICE — SPONGE GZ W4XL4IN RAYON POLY FILL CVR W/ NONWOVEN FAB

## (undated) DEVICE — FORCEPS BX L240CM JAW DIA2.4MM ORNG L CAP W/ NDL DISP RAD